# Patient Record
Sex: FEMALE | Race: WHITE | Employment: FULL TIME | URBAN - METROPOLITAN AREA
[De-identification: names, ages, dates, MRNs, and addresses within clinical notes are randomized per-mention and may not be internally consistent; named-entity substitution may affect disease eponyms.]

---

## 2017-05-01 ENCOUNTER — DOCUMENTATION ONLY (OUTPATIENT)
Dept: SURGERY | Age: 32
End: 2017-05-01

## 2017-05-02 ENCOUNTER — OFFICE VISIT (OUTPATIENT)
Dept: SURGERY | Age: 32
End: 2017-05-02

## 2017-05-02 VITALS
HEART RATE: 71 BPM | SYSTOLIC BLOOD PRESSURE: 96 MMHG | BODY MASS INDEX: 32.44 KG/M2 | DIASTOLIC BLOOD PRESSURE: 51 MMHG | WEIGHT: 190 LBS | HEIGHT: 64 IN

## 2017-05-02 DIAGNOSIS — Z15.01 BRCA2 GENE MUTATION POSITIVE: Primary | ICD-10-CM

## 2017-05-02 DIAGNOSIS — Z15.09 BRCA2 GENE MUTATION POSITIVE: Primary | ICD-10-CM

## 2017-05-02 PROBLEM — Z80.3 FAMILY HX-BREAST MALIGNANCY: Status: ACTIVE | Noted: 2017-05-02

## 2017-05-02 RX ORDER — CETIRIZINE HCL 10 MG
TABLET ORAL
Refills: 1 | COMMUNITY
Start: 2017-04-16

## 2017-05-02 NOTE — PATIENT INSTRUCTIONS
Breast Self-Exam: Care Instructions  Your Care Instructions  A breast self-exam is when you check your breasts for lumps or changes. This regular exam helps you learn how your breasts normally look and feel. Most breast problems or changes are not because of cancer. Breast self-exam is not a substitute for a mammogram. Having regular breast exams by your doctor and regular mammograms improve your chances of finding any problems with your breasts. Some women set a time each month to do a step-by-step breast self-exam. Other women like a less formal system. They might look at their breasts as they brush their teeth, or feel their breasts once in a while in the shower. If you notice a change in your breast, tell your doctor. Follow-up care is a key part of your treatment and safety. Be sure to make and go to all appointments, and call your doctor if you are having problems. Its also a good idea to know your test results and keep a list of the medicines you take. How do you do a breast self-exam?  · The best time to examine your breasts is usually one week after your menstrual period begins. Your breasts should not be tender then. If you do not have periods, you might do your exam on a day of the month that is easy to remember. · To examine your breasts:  ¨ Remove all your clothes above the waist and lie down. When you are lying down, your breast tissue spreads evenly over your chest wall, which makes it easier to feel all your breast tissue. ¨ Use the pads--not the fingertips--of the 3 middle fingers of your left hand to check your right breast. Move your fingers slowly in small coin-sized circles that overlap. ¨ Use three levels of pressure to feel of all your breast tissue. Use light pressure to feel the tissue close to the skin surface. Use medium pressure to feel a little deeper. Use firm pressure to feel your tissue close to your breastbone and ribs.  Use each pressure level to feel your breast tissue before moving on to the next spot. ¨ Check your entire breast, moving up and down as if following a strip from the collarbone to the bra line, and from the armpit to the ribs. Repeat until you have covered the entire breast.  ¨ Repeat this procedure for your left breast, using the pads of the 3 middle fingers of your right hand. · To examine your breasts while in the shower:  ¨ Place one arm over your head and lightly soap your breast on that side. ¨ Using the pads of your fingers, gently move your hand over your breast (in the strip pattern described above), feeling carefully for any lumps or changes. ¨ Repeat for the other breast.  · Have your doctor inspect anything you notice to see if you need further testing. Where can you learn more? Go to http://will-sharon.info/. Enter P148 in the search box to learn more about \"Breast Self-Exam: Care Instructions. \"  Current as of: July 26, 2016  Content Version: 11.2  © 3965-3321 [employer]. Care instructions adapted under license by [employer] (which disclaims liability or warranty for this information). If you have questions about a medical condition or this instruction, always ask your healthcare professional. [place] [address] any warranty or liability for your use of this information.

## 2017-05-02 NOTE — MR AVS SNAPSHOT
Visit Information Date & Time Provider Department Dept. Phone Encounter #  
 5/2/2017  9:00 AM Yani Mchugh, [address] Breast [address] [id number] Upcoming Health Maintenance Date Due DTaP/Tdap/Td series (1 - Tdap) 11/2/2005 PAP AKA CERVICAL CYTOLOGY 11/2/2005 INFLUENZA AGE 9 TO ADULT 8/1/2017 Allergies as of 5/2/2017  Review Complete On: 5/2/2017 By: Yani Mchugh MD  
  
 Severity Noted Reaction Type Reactions Morphine  05/02/2017    Nausea and Vomiting  
 Sulfa (Sulfonamide Antibiotics)  05/02/2017    Other (comments) Hallucinations Current Immunizations  Never Reviewed No immunizations on file. Not reviewed this visit You Were Diagnosed With   
  
 Codes Comments BRCA2 gene mutation positive    -  Primary ICD-10-CM: Z15.01, Z15.02 
ICD-9-CM: V84.01 Vitals BP Pulse Height(growth percentile) Weight(growth percentile) LMP BMI  
 96/51 (BP 1 Location: Left arm, BP Patient Position: Sitting) 71 5' 4\" (1.626 m) 190 lb (86.2 kg) 04/29/2017 32.61 kg/m2 OB Status Smoking Status Having regular periods Never Smoker BMI and BSA Data Body Mass Index Body Surface Area  
 32.61 kg/m 2 1.97 m 2 Your Updated Medication List  
  
   
This list is accurate as of: 5/2/17  4:44 PM.  Always use your most recent med list.  
  
  
  
  
 cetirizine 10 mg tablet Commonly known as:  ZYRTEC  
TAKE 1 TABLET BY MOUTH AT BEDTIME  
  
 FISH OIL PO Take  by mouth. MAGNESIUM CARBONATE PO Take  by mouth. PROBIOTIC FORMULA PO Take  by mouth. VITAMIN C PO Take  by mouth. Patient Instructions Breast Self-Exam: Care Instructions Your Care Instructions A breast self-exam is when you check your breasts for lumps or changes. This regular exam helps you learn how your breasts normally look and feel. Most breast problems or changes are not because of cancer. Breast self-exam is not a substitute for a mammogram. Having regular breast exams by your doctor and regular mammograms improve your chances of finding any problems with your breasts. Some women set a time each month to do a step-by-step breast self-exam. Other women like a less formal system. They might look at their breasts as they brush their teeth, or feel their breasts once in a while in the shower. If you notice a change in your breast, tell your doctor. Follow-up care is a key part of your treatment and safety. Be sure to make and go to all appointments, and call your doctor if you are having problems. Its also a good idea to know your test results and keep a list of the medicines you take. How do you do a breast self-exam? 
· The best time to examine your breasts is usually one week after your menstrual period begins. Your breasts should not be tender then. If you do not have periods, you might do your exam on a day of the month that is easy to remember. · To examine your breasts: ¨ Remove all your clothes above the waist and lie down. When you are lying down, your breast tissue spreads evenly over your chest wall, which makes it easier to feel all your breast tissue. ¨ Use the padsnot the fingertipsof the 3 middle fingers of your left hand to check your right breast. Move your fingers slowly in small coin-sized circles that overlap. ¨ Use three levels of pressure to feel of all your breast tissue. Use light pressure to feel the tissue close to the skin surface. Use medium pressure to feel a little deeper. Use firm pressure to feel your tissue close to your breastbone and ribs. Use each pressure level to feel your breast tissue before moving on to the next spot. ¨ Check your entire breast, moving up and down as if following a strip from the collarbone to the bra line, and from the armpit to the ribs.  Repeat until you have covered the entire breast. 
 ¨ Repeat this procedure for your left breast, using the pads of the 3 middle fingers of your right hand. · To examine your breasts while in the shower: 
¨ Place one arm over your head and lightly soap your breast on that side. ¨ Using the pads of your fingers, gently move your hand over your breast (in the strip pattern described above), feeling carefully for any lumps or changes. ¨ Repeat for the other breast. 
· Have your doctor inspect anything you notice to see if you need further testing. Where can you learn more? Go to http://will-sharon.info/. Enter P148 in the search box to learn more about \"Breast Self-Exam: Care Instructions. \" Current as of: July 26, 2016 Content Version: 11.2 © 8458-8070 [employer]. Care instructions adapted under license by [employer] (which disclaims liability or warranty for this information). If you have questions about a medical condition or this instruction, always ask your healthcare professional. [place] [address] any warranty or liability for your use of this information. Introducing \[facility]\"" & HEALTH SERVICES! Nancy Doan introduces Groupiter patient portal. Now you can access parts of your medical record, email your doctor's office, and request medication refills online. 1. In your internet browser, go to [email or web address] 2. Click on the First Time User? Click Here link in the Sign In box. You will see the New Member Sign Up page. 3. Enter your Groupiter Access Code exactly as it appears below. You will not need to use this code after youve completed the sign-up process. If you do not sign up before the expiration date, you must request a new code. · Groupiter Access Code: [email or web address] Expires: 7/31/2017  8:42 AM 
 
4. Enter the last four digits of your Social Security Number (xxxx) and Date of Birth (mm/dd/yyyy) as indicated and click Submit.  You will be taken to the next sign-up page. 5. Create a ColorChip ID. This will be your ColorChip login ID and cannot be changed, so think of one that is secure and easy to remember. 6. Create a ColorChip password. You can change your password at any time. 7. Enter your Password Reset Question and Answer. This can be used at a later time if you forget your password. 8. Enter your e-mail address. You will receive e-mail notification when new information is available in [address]. 9. Click Sign Up. You can now view and download portions of your medical record. 10. Click the Download Summary menu link to download a portable copy of your medical information. If you have questions, please visit the Frequently Asked Questions section of the ColorChip website. Remember, ColorChip is NOT to be used for urgent needs. For medical emergencies, dial 911. Now available from your iPhone and Android! Please provide this summary of care documentation to your next provider. Your primary care clinician is listed as Carl Phelan. If you have any questions after today's visit, please call [phone].

## 2017-05-02 NOTE — COMMUNICATION BODY
HISTORY OF PRESENT ILLNESS  Ovidio Tay is a 27 y.o. female. HPI    NEW patient presents for consultation at the request of Dr. Theresa Guerrero and Dr. Rosaline Cervantes for [facility] of breast cancer and BRCA 2 mutation. She found out about three weeks ago that she is BRCA positive. She has no breast complaints currently. Feels no lumps. BILATERAL screening mammogram 4/25/17 at [facility], BIRADS 1, negative. She is scheduled for a breast MRI through the 606/[facility]  FH is significant for her paternal grandmother who was diagnosed with breast CA age 28, a maternal grandmother probably had breast CA age [de-identified]. OB History     Obstetric Comments    Menarche:  14. LMP: 4/29/17. # of Children:  0. Age at Delivery of First Child:  N/A. Hysterectomy/oophorectomy:  NO/NO. Breast Bx:  No.  Hx of Breast Feeding:  N/A.  BCP:  Yes, until recently. Ling Cashing Hormone therapy:  No.           Review of Systems   Constitutional: Negative. HENT: Negative. Eyes: Negative. Respiratory: Negative. Cardiovascular: Negative. Gastrointestinal: Positive for constipation. Genitourinary: Negative. Musculoskeletal: Negative. Skin: Negative. Neurological: Negative. Endo/Heme/Allergies: Negative. Psychiatric/Behavioral: Negative. Positive for high stress. Physical Exam   Pulmonary/Chest: Right breast exhibits no inverted nipple, no mass, no nipple discharge, no skin change and no tenderness. Left breast exhibits no inverted nipple, no mass, no nipple discharge, no skin change and no tenderness. Breasts are symmetrical.   Lymphadenopathy:     She has no cervical adenopathy. She has no axillary adenopathy. Right: No supraclavicular adenopathy present. Left: No supraclavicular adenopathy present. ASSESSMENT and PLAN    ICD-10-CM ICD-9-CM    1.  BRCA2 gene mutation positive Z15.01 V84.01     Z15.02       40 minutes were spent in consultation with the patient and her  and her parents. 90% of that time was spent on discussing surveillance and surgical options. Pt's paternal grandmother had breast cancer in her 29's. There is no other history of breast cancer in her family. Pt is BRCA 2 positive. She met with Carlyn Hernandez at [facility]. He is going to test her parents to see if either one of them have a mutated BRCA gene. We discussed MRI surveillance or prophylactic mastectomies. At this point she is leaning towards prophylactic mastectomies. She is meeting with Dr Vane Danielson this afternoon. She would like to have direct-to-implant surgery and will discuss this with Dr Vane Danielson. We also talked about pre-pectoral implants. She is a santos. She is having an MRI in [facility]. She is planning on having children. She is not concerned about breast feeding so she may have mastectomies before she gets pregnant. PLAN:  Parents will be BRCA tested. Breast MRI  appt with Dr Vane Danielson.

## 2017-05-02 NOTE — PROGRESS NOTES
HISTORY OF PRESENT ILLNESS  Kiley Alas is a 27 y.o. female. HPI    NEW patient presents for consultation at the request of Dr. Veena Gramajo and Dr. Cassie Barrios for [facility] of breast cancer and BRCA 2 mutation. She found out about three weeks ago that she is BRCA positive. She has no breast complaints currently. Feels no lumps. BILATERAL screening mammogram 4/25/17 at [facility], BIRADS 1, negative. She is scheduled for a breast MRI through the 606/596 [facility]  FH is significant for her paternal grandmother who was diagnosed with breast CA age 28, a maternal grandmother probably had breast CA age [de-identified]. OB History     Obstetric Comments    Menarche:  14. LMP: 4/29/17. # of Children:  0. Age at Delivery of First Child:  N/A. Hysterectomy/oophorectomy:  NO/NO. Breast Bx:  No.  Hx of Breast Feeding:  N/A.  BCP:  Yes, until recently. Chales Minus Hormone therapy:  No.           Review of Systems   Constitutional: Negative. HENT: Negative. Eyes: Negative. Respiratory: Negative. Cardiovascular: Negative. Gastrointestinal: Positive for constipation. Genitourinary: Negative. Musculoskeletal: Negative. Skin: Negative. Neurological: Negative. Endo/Heme/Allergies: Negative. Psychiatric/Behavioral: Negative. Positive for high stress. Physical Exam   Pulmonary/Chest: Right breast exhibits no inverted nipple, no mass, no nipple discharge, no skin change and no tenderness. Left breast exhibits no inverted nipple, no mass, no nipple discharge, no skin change and no tenderness. Breasts are symmetrical.   Lymphadenopathy:     She has no cervical adenopathy. She has no axillary adenopathy. Right: No supraclavicular adenopathy present. Left: No supraclavicular adenopathy present. ASSESSMENT and PLAN    ICD-10-CM ICD-9-CM    1.  BRCA2 gene mutation positive Z15.01 V84.01     Z15.02       40 minutes were spent in consultation with the patient and her  and her parents. 90% of that time was spent on discussing surveillance and surgical options. Pt's paternal grandmother had breast cancer in her 29's. There is no other history of breast cancer in her family. Pt is BRCA 2 positive. She met with Kristel Berrios at [facility]. He is going to test her parents to see if either one of them have a mutated BRCA gene. We discussed MRI surveillance or prophylactic mastectomies. At this point she is leaning towards prophylactic mastectomies. She is meeting with Dr India Goncalves this afternoon. She would like to have direct-to-implant surgery and will discuss this with Dr India Goncalves. We also talked about pre-pectoral implants. She is a santos. She is having an MRI in [facility]. She is planning on having children. She is not concerned about breast feeding so she may have mastectomies before she gets pregnant. PLAN:  Parents will be BRCA tested. Breast MRI  appt with Dr India Goncalves.

## 2017-05-03 ENCOUNTER — TELEPHONE (OUTPATIENT)
Dept: SURGERY | Age: 32
End: 2017-05-03

## 2017-05-12 NOTE — TELEPHONE ENCOUNTER
Left a voice message about her surgery information. [facility] will call again on Monday May 15,2017 and give it to her over the phone and mail a letter if needed.

## 2017-05-15 NOTE — TELEPHONE ENCOUNTER
Verbally informed patient of surgery   Date: 07/12/2017 @ 11:30 AM    Arrive:9:30 AM  report to 2nd floor volunteer desk, take a right off the elevator    PAT: 06/29/2017 10:00 AM   Arrive: 9:30 AM report to 2nd floor volunteer desk, take a right off the elevator      Gave following instructions:  NPO after Midnight the night before  Shower in AM, no lotion, deodorant, powder,perfume or makeup  Will need  morning of the surgery    I mailed and emailed patient her surgery information. roland

## 2017-05-25 ENCOUNTER — TELEPHONE (OUTPATIENT)
Dept: SURGERY | Age: 32
End: 2017-05-25

## 2017-05-25 NOTE — TELEPHONE ENCOUNTER
The patient called to ask if she may take a probiotic up until surgery and I instructed her that she can continue with the probiotic. She does not have a date yet for her surgery. But this medication was ok to take until surgery. The patient was appreciative.

## 2017-06-06 ENCOUNTER — OFFICE VISIT (OUTPATIENT)
Dept: SURGERY | Age: 32
End: 2017-06-06

## 2017-06-06 VITALS
SYSTOLIC BLOOD PRESSURE: 113 MMHG | BODY MASS INDEX: 32.44 KG/M2 | HEART RATE: 69 BPM | WEIGHT: 190 LBS | HEIGHT: 64 IN | DIASTOLIC BLOOD PRESSURE: 70 MMHG

## 2017-06-06 DIAGNOSIS — Z15.09 BRCA2 POSITIVE: Primary | ICD-10-CM

## 2017-06-06 DIAGNOSIS — Z15.01 BRCA2 POSITIVE: Primary | ICD-10-CM

## 2017-06-06 RX ORDER — GLUCOSAMINE SULFATE 1500 MG
5000 POWDER IN PACKET (EA) ORAL DAILY
COMMUNITY

## 2017-06-06 RX ORDER — LANOLIN ALCOHOL/MO/W.PET/CERES
CREAM (GRAM) TOPICAL DAILY
COMMUNITY

## 2017-06-06 NOTE — PROGRESS NOTES
HISTORY OF PRESENT ILLNESS  Charo Claros is a 27 y.o. female. HPI ESTABLISHED patient here to discuss surgery. Patient is BRCA 2+. Prophylactic mastectomies with reconstruction scheduled for July 17 with Dr Loren Brock. Patient is doing well. FH is significant for her paternal grandmother who was diagnosed with breast CA age 28, and a maternal grandmother probably had breast CA age [de-identified]. Her father is BRCA positive. Her sister has not been tested. Breast MRI and RIGHT breast biopsy in [place] showed an intraductal papilloma. ROS    Physical Exam    ASSESSMENT and PLAN    ICD-10-CM ICD-9-CM    1. BRCA2 positive Z15.01 V84.01     Z15.02       40 minutes were spent with Ms Subhash Rebollar. She had questions about her upcoming surgery. We talked about implants, above and below the muscle, capsular contracture, pain after surgery, possibility of having to replace implants, cancer surveillance after mastectomies. She is scheduled for surgery July 17th.

## 2017-06-06 NOTE — PATIENT INSTRUCTIONS

## 2017-06-26 ENCOUNTER — DOCUMENTATION ONLY (OUTPATIENT)
Dept: SURGERY | Age: 32
End: 2017-06-26

## 2017-06-26 NOTE — PROGRESS NOTES
Patient called and left a message for me to please return her call. When I did so she let me know she was going to wait for her surgery. She was going to make sure she was making the right decision. She was going to take some time and research with her  before going through with this. I assured her that was fine and she could call us back when she decided.  I also told her to call Dr Matthew Avila if she needed to. Toni Caballero

## 2017-08-18 ENCOUNTER — TELEPHONE (OUTPATIENT)
Dept: SURGERY | Age: 32
End: 2017-08-18

## 2017-08-18 NOTE — TELEPHONE ENCOUNTER
The patient called and left a message requesting to be seen today due to \"something concerning\" on her breast. I spoke with Valerie Jacinto NP who requests I call the patient back and get more information. I called and left a message on her voicemail to call us back.

## 2017-08-18 NOTE — TELEPHONE ENCOUNTER
The patient called back stating she has her prophylactic mastectomies scheduled for next month in [place]. She found a palpable breast lump within the last several days. Suggested she come in for an office visit and she was in agreement. The appointment was made for Tuesday 8/22/2017 @ 130 pm with Dr. Mu Upton.

## 2017-08-22 ENCOUNTER — OFFICE VISIT (OUTPATIENT)
Dept: SURGERY | Age: 32
End: 2017-08-22

## 2017-08-22 VITALS
HEIGHT: 64 IN | BODY MASS INDEX: 32.44 KG/M2 | HEART RATE: 100 BPM | SYSTOLIC BLOOD PRESSURE: 120 MMHG | WEIGHT: 190 LBS | DIASTOLIC BLOOD PRESSURE: 78 MMHG

## 2017-08-22 DIAGNOSIS — N63.10 BREAST MASS, RIGHT: Primary | ICD-10-CM

## 2017-08-22 RX ORDER — PREDNISONE 10 MG/1
TABLET ORAL
Refills: 0 | COMMUNITY
Start: 2017-08-10 | End: 2017-10-05 | Stop reason: ALTCHOICE

## 2017-08-22 NOTE — COMMUNICATION BODY
HISTORY OF PRESENT ILLNESS  Barbra Castaneda is a 27 y.o. female. HPI ESTABLISHED patient presents with a RIGHT breast lump at the inframammary fold. The lump is the size of a lima bean and is slightly tender to palpation. It is close to the surface of her skin. She is scheduled for prophylactic mastectomy in [facility]. next month. She denies any nipple inversion or discharge. BRCA 2 positive. FH is significant for her paternal grandmother who was diagnosed with breast CA age 28, a maternal grandmother probably had breast CA age [de-identified]. The patient's father is BRCA positive. ROS    Physical Exam   Pulmonary/Chest: Right breast exhibits mass (1 cm oval soft mobile mass at inframammary fold. ). Right breast exhibits no inverted nipple, no nipple discharge, no skin change and no tenderness. Left breast exhibits no inverted nipple, no mass, no nipple discharge, no skin change and no tenderness. Breasts are symmetrical.       Lymphadenopathy:     She has no cervical adenopathy. She has no axillary adenopathy. Right: No supraclavicular adenopathy present. Left: No supraclavicular adenopathy present. BREAST ULTRASOUND  Indication: Right  breast mass, 1 cm soft mobile mass  Technique: The area was scanned using a high-frequency linear-array near-field transducer  Findings: 9.7 mm fatty lobule corresponds with palpable mass  Impression: Normal breast tissue   Disposition: No worrisome finding on ultrasound  ASSESSMENT and PLAN    ICD-10-CM ICD-9-CM    1. Breast mass, right N63 611.72      RIGHT breast mass is a normal fatty lobule. Pt is having prophylactic mastectomies with prepectoral implants in one month ([facility])  MRI every 3 years were recommended for surveillance. PRN follow up.

## 2017-08-22 NOTE — PROGRESS NOTES
HISTORY OF PRESENT ILLNESS  Ancelmo Lozano is a 27 y.o. female. HPI ESTABLISHED patient presents with a RIGHT breast lump at the inframammary fold. The lump is the size of a lima bean and is slightly tender to palpation. It is close to the surface of her skin. She is scheduled for prophylactic mastectomy in [facility]. next month. She denies any nipple inversion or discharge. BRCA 2 positive. FH is significant for her paternal grandmother who was diagnosed with breast CA age 28, a maternal grandmother probably had breast CA age [de-identified]. The patient's father is BRCA positive. ROS    Physical Exam   Pulmonary/Chest: Right breast exhibits mass (1 cm oval soft mobile mass at inframammary fold. ). Right breast exhibits no inverted nipple, no nipple discharge, no skin change and no tenderness. Left breast exhibits no inverted nipple, no mass, no nipple discharge, no skin change and no tenderness. Breasts are symmetrical.       Lymphadenopathy:     She has no cervical adenopathy. She has no axillary adenopathy. Right: No supraclavicular adenopathy present. Left: No supraclavicular adenopathy present. BREAST ULTRASOUND  Indication: Right  breast mass, 1 cm soft mobile mass  Technique: The area was scanned using a high-frequency linear-array near-field transducer  Findings: 9.7 mm fatty lobule corresponds with palpable mass  Impression: Normal breast tissue   Disposition: No worrisome finding on ultrasound  ASSESSMENT and PLAN    ICD-10-CM ICD-9-CM    1. Breast mass, right N63 611.72      RIGHT breast mass is a normal fatty lobule. Pt is having prophylactic mastectomies with prepectoral implants in one month ([facility] [address] [facility])  MRI every 3 years were recommended for surveillance. PRN follow up.

## 2017-08-22 NOTE — MR AVS SNAPSHOT
Visit Information Date & Time Provider Department Dept. Phone Encounter #  
 8/22/2017  1:30 PM Carroll Garcia, [address] [facility] [phone] [id number] Upcoming Health Maintenance Date Due DTaP/Tdap/Td series (1 - Tdap) 11/2/2005 PAP AKA CERVICAL CYTOLOGY 11/2/2005 INFLUENZA AGE 9 TO ADULT 8/1/2017 Allergies as of 8/22/2017  Review Complete On: 8/22/2017 By: Carroll Garcia MD  
  
 Severity Noted Reaction Type Reactions Morphine  05/02/2017    Nausea and Vomiting  
 Sulfa (Sulfonamide Antibiotics)  05/02/2017    Other (comments) Hallucinations Current Immunizations  Never Reviewed No immunizations on file. Not reviewed this visit You Were Diagnosed With   
  
 Codes Comments Breast mass, right    -  Primary ICD-10-CM: N63 
ICD-9-CM: 611.72 Vitals BP Pulse Height(growth percentile) Weight(growth percentile) BMI OB Status 120/78 100 5' 4\" (1.626 m) 190 lb (86.2 kg) 32.61 kg/m2 Having regular periods Smoking Status Never Smoker BMI and BSA Data Body Mass Index Body Surface Area  
 32.61 kg/m 2 1.97 m 2 Your Updated Medication List  
  
   
This list is accurate as of: 8/22/17  4:14 PM.  Always use your most recent med list.  
  
  
  
  
 cetirizine 10 mg tablet Commonly known as:  ZYRTEC  
TAKE 1 TABLET BY MOUTH AT BEDTIME  
  
 FISH OIL PO Take  by mouth. MAGNESIUM CARBONATE PO Take 300 mg by mouth.  
  
 predniSONE 10 mg tablet Commonly known as:  DELTASONE  
TAKE 6 TABS ON DAYS 1&2,5 TABS DAYS 3&4,4 TABS DAYS 5&6,3 TABS ON 7&8,2 TABS 9&10&1 TAB DAYS 11&12 PROBIOTIC 4X 10-15 mg Tbec Generic drug:  B.infantis-B.ani-B.long-B.bifi Take  by mouth. PROBIOTIC FORMULA PO Take  by mouth. VITAMIN B-6 50 mg tablet Generic drug:  pyridoxine (vitamin B6) Take  by mouth daily. VITAMIN C PO Take 4,000 mg by mouth. VITAMIN D3 1,000 unit Cap Generic drug:  cholecalciferol Take 5,000 Units by mouth daily. Introducing [facility]! Tami Cline introduces [employer] patient portal. Now you can access parts of your medical record, email your doctor's office, and request medication refills online. 1. In your internet browser, go to [email or web address] 2. Click on the First Time User? Click Here link in the Sign In box. You will see the New Member Sign Up page. 3. Enter your [employer] Access Code exactly as it appears below. You will not need to use this code after youve completed the sign-up process. If you do not sign up before the expiration date, you must request a new code. · [employer] Access Code: [email or web address] Expires: 11/20/2017  2:03 PM 
 
4. Enter the last four digits of your Social Security Number (xxxx) and Date of Birth (mm/dd/yyyy) as indicated and click Submit. You will be taken to the next sign-up page. 5. Create a [employer] ID. This will be your [employer] login ID and cannot be changed, so think of one that is secure and easy to remember. 6. Create a [employer] password. You can change your password at any time. 7. Enter your Password Reset Question and Answer. This can be used at a later time if you forget your password. 8. Enter your e-mail address. You will receive e-mail notification when new information is available in [address]. 9. Click Sign Up. You can now view and download portions of your medical record. 10. Click the Download Summary menu link to download a portable copy of your medical information. If you have questions, please visit the Frequently Asked Questions section of the [employer] website. Remember, [employer] is NOT to be used for urgent needs. For medical emergencies, dial 911. Now available from your iPhone and Android! Please provide this summary of care documentation to your next provider. Your primary care clinician is listed as Noemi Clement. If you have any questions after today's visit, please call [phone].

## 2017-10-04 ENCOUNTER — HOSPITAL ENCOUNTER (OUTPATIENT)
Dept: PHYSICAL THERAPY | Age: 32
Discharge: HOME | End: 2017-10-04
Payer: MEDICAID

## 2017-10-04 PROCEDURE — 97530 THERAPEUTIC ACTIVITIES: CPT | Performed by: PHYSICAL THERAPIST

## 2017-10-04 PROCEDURE — 97161 PT EVAL LOW COMPLEX 20 MIN: CPT | Performed by: PHYSICAL THERAPIST

## 2017-10-04 PROCEDURE — 97110 THERAPEUTIC EXERCISES: CPT | Performed by: PHYSICAL THERAPIST

## 2017-10-04 NOTE — PROGRESS NOTES
[address] [place], [facility] Blanca Thorne 56  Phone: [phone]  Fax: [phone]    Plan of Care/ Statement of Necessity for Physical Therapy Services 2-15    Patient name: Song Smith  : 1985  Provider#: [id number]  Referral source: Dale Mesa MD      Medical/Treatment Diagnosis: Breast pain, right [N64.4]  Pain in right shoulder [M25.511]  Pain in left shoulder [M25.512]     Prior Hospitalization: see medical history     Comorbidities: previous parathyroid surgery, diaphragmatic/colon childhood surgery, BRCA2+  Prior Level of Function: no limitations in UE ROM or strength during any ADL's  Medications: Verified on Patient Summary List    Start of Care: 10/4/2017      Onset Date: 2017       The Plan of Care and following information is based on the information from the initial evaluation. Assessment/ key information: Pt s/p B mastectomies with prepectoral implant reconstruction on 2017. Pt discovered a \"hardened area around right pec muscle\" and was referred to PT for \"cording\". Pt presents with tenderness to palpation along R lateral border of pectoralis major, decreased AROM B sh flexion, external rotation, and abduction with end range discomfort.     Evaluation Complexity History LOW Complexity : Zero comorbidities / personal factors that will impact the outcome / POC; Examination LOW Complexity : 1-2 Standardized tests and measures addressing body structure, function, activity limitation and / or participation in recreation  ;Presentation LOW Complexity : Stable, uncomplicated  ;Clinical Decision Making MEDIUM Complexity : FOTO score of 26-74  Overall Complexity Rating: LOW     Problem List: pain affecting function, decrease ROM, decrease strength, edema affecting function and decrease ADL/ functional abilitiies   Treatment Plan may include any combination of the following: Therapeutic exercise, Therapeutic activities, Neuromuscular re-education, Manual therapy, Patient education and Functional mobility training  Patient / Family readiness to learn indicated by: asking questions, trying to perform skills and interest  Persons(s) to be included in education: patient (P)  Barriers to Learning/Limitations: None  Patient Goal (s): get rid of cording  Patient Self Reported Health Status: good  Rehabilitation Potential: good    Short Term Goals: To be accomplished in 2  treatments:  1) Pt will be Independent with skin care routine to decrease risk of infection. 2) Pt will be Independent in don/doff/wearing schedule of light compression to decrease discomfort. 3) Pt will know which signs and symptoms to report immediately to physician concerning their condition. Long Term Goals: To be accomplished in 12 treatments:  1) Pt will be Independent with compression management routine consisting of skin care, decongestive exercises, self manual lymphatic stimulation techniques, and don/doff of appropriate compression garment if necessary,  2) Pt will be Independent with HEP for edema management, utilizing correct breath pattern, strengthening, and motion exercises. 3) Pt will utilize correct breath and movement patterns during all ADL's involving reaching above shoulder level without pain. 4) Pt will have increased B shoulder AROM elevation and ER by 30-40% with decreased pain by 3-4 levels when using B UE's in all ADL's involving reaching at or above shoulder level. Frequency / Duration: Patient to be seen 1-2 times per week for 12 treatments. Patient/ Caregiver education and instruction: activity modification, exercises and other infection risk reduction    [x]  Plan of care has been reviewed with KAREN AHUMADA, CLT-MITCHELL 10/4/2017 3:16 PM    ________________________________________________________________________    I certify that the above Therapy Services are being furnished while the patient is under my care.  I agree with the treatment plan and certify that this therapy is necessary.     [de-identified] Signature:____________________  Date:____________Time: _________

## 2017-10-04 NOTE — PROGRESS NOTES
PT ONCOLOGY EVAL/DAILY NOTE    Patient Name: Patricia Singhun  Date:10/4/2017  : 1985  [x]  Patient  Verified  Payor: [employer] MEDICAID / Plan: [place] [employer] [place] / Product Type: Managed Care Medicaid /    In time:2:00  Out time:3:00  Total Treatment Time (min): 60  Total Timed Codes (min): 30     Visit #: 1 of 12    Treatment Area: Breast pain, right [N64.4]  Pain in right shoulder [M25.511]  Pain in left shoulder [M25.512]    SUBJECTIVE    Any medication changes, allergies to medications, adverse drug reactions, diagnosis change, or new procedure performed?: [x] No    [] Yes (see summary sheet for update)    Pain Level : At Present: 1/10, At [facility] 1/10, At [facility] 1/10   [] Constant [x]Intermittent      Current symptoms/Complaints: Pt c/o's of \"hardened area around R pectoralis major muscle\" that is tender to touch and increases in discomfort when reaching above shoulder level     [] Constant [x]Intermittent    Subjective functional status:     Present Symptoms/History: B mastectomies with prepectoral implant reconstruction on 2017  Referring Provider: Cassy Bassett MD    Plastic Surgeon: Dr Shantel Arias   Primary Care Physician: Ronney Schlatter, MD  Next Appointment: Dr China Fournier [facility]) Oct 20th  Diagnosis:  S/p B prophylactic mastectomies with implant reconstruction,     Precautions/Indications: infection risk  History of Present Illness:      Surgery:B mastectomies with prepectoral implant reconstruction on 2017          ·  Pain Intensity:1 on a scale of 0-10  · Pain Aggravating Factors: stretch to R pec area  · Pain Relieving Factors: not moving R UE      Has your activity changed since diagnosis? yes  Limitations/Prior level of functioning: lifting restrictions no more than 10 pounds following surgery on 2017.   Pt has been working from home 3-4 hrs/day and plans on returning 1/2 days to work on Tuesday 10/10/2017  Dominant Hand: right handed  Personal Goals: get rid of cording/hardness in area of R chest  Home Situation (including environment, stairs, family support, if living alone, any DME used at home):  Good family support of sisters and mother. Mother present at evaluation  Work Status: working from home, , returning 1/2 days on 10/10/2017   Current meds: see chart    Barriers:    [x]N/A []pain []financial []time []transportation []other  Motivation: high  Substance use:   [x]N/A  []Alcohol []Tobacco []other:   FABQ Score: [x]low []elevate    Cognition: A & O x 4        OBJECTIVE    Skin/Soft Tissue: no signs of infection at incision or drain sites. Several open areas at incision that pt was instructed by surgeon to keep clean and dry. Currently applying non adherent gauze at all sites underneath light compression bra     Scapulohumoral Control / Rhythm:   Able to eccentrically lower with good control?  Left:   No         Right: No      Upper Extremity AROM:                                                                                R                                 L  Shoulder Flexion  125   117                                                                                   Shoulder Abduction 119   115  Shoulder Extension 15   21  Shoulder ER  45   43  Shoulder IR  52   49                                                                                                         Elbow and wrist = WNL's Bilaterally                                            UPPER QUARTER                           MUSCLE STRENGTH  KEY                                                                            R                     L  0 - No Contraction                  C1, C2 Neck Flex       4                                 1 - Trace                                 C3 Side Flex   4  4                                       2 - Poor                                   C4 Sh Elev 4  4                                            3 - Fair                                     C5 Deltoid/Biceps 3+  3+                                 4 - Good                                  C6 Wrist Ext 4+  4+                                          5 - Normal                               C7 Triceps 3+  3+                                                                                            C8 Thumb Ext 4+  4                                                                                                  T1 Hand Inst 4+  4                                           MMT: See above  Shoulder flexion     3-  3+  Shoulder ER     3  3  Shoulder IR     3  3    Neurological: Sensations: intact to LT throughout B upper quarter    Mobility/Gait: No observable gait deficits, slight difficulty don/doff bra and shirts   Palpation: TTP along R lateral border of pec major  Posture: forward head, B g-h jt IR  Breath pattern assessment  Diaphragm: difficulty with initiation, requires v.c's, not primary pattern  Anterior chest: primary   Accessory respiratory muscle use: UT, scalenes    30 min [x]Eval                  []Re-Eval       15 min Therapeutic Exercise:  [x] See flow sheet :   Rationale: increase ROM and increase strength to improve the patients ability to perform ADL's required for return to work and/or community      15 min Therapeutic Activity:  [x]  See flow sheet :   Rationale: infection risk reduction and skin care education  to improve the patients ability to decrease post op complications            With   [] TE   [] TA   [] neuro   [] other: Patient Education: [x] Review HEP    [] Progressed/Changed HEP based on:   [] positioning   [] body mechanics   [] transfers   [] heat/ice application    [] other:           Pain Level (0-10 scale) post treatment: 1    ASSESSMENT/Changes in Function: Pt s/p B mastectomies with prepectoral implant reconstruction on 9/22/2017. Pt discovered a \"hardened area around right pec muscle\" and was referred to PT for \"cording\".   Pt presents with tenderness to palpation along R lateral border of pectoralis major, decreased AROM B sh flexion, external rotation, and abduction with end range discomfort.  Patient will benefit from skilled PT services to modify and progress therapeutic interventions, address functional mobility deficits, address ROM deficits, address strength deficits, analyze and address soft tissue restrictions, analyze and cue movement patterns, assess and modify postural abnormalities and instruct in home and community integration to address rehab goals per plan of care          Goals:  See POC  PLAN  []  Upgrade activities as tolerated     [x]  Continue plan of care  []  Update interventions per flow sheet       []  Discharge due to:_  []  Other:_      TAMARA Easton 10/4/2017  3:16 PM

## 2017-10-05 ENCOUNTER — OFFICE VISIT (OUTPATIENT)
Dept: SURGERY | Age: 32
End: 2017-10-05

## 2017-10-05 VITALS
SYSTOLIC BLOOD PRESSURE: 121 MMHG | WEIGHT: 190 LBS | HEIGHT: 64 IN | BODY MASS INDEX: 32.44 KG/M2 | DIASTOLIC BLOOD PRESSURE: 75 MMHG | HEART RATE: 83 BPM

## 2017-10-05 DIAGNOSIS — N63.0 BREAST MASS: Primary | ICD-10-CM

## 2017-10-05 RX ORDER — DIAZEPAM 5 MG/1
5 TABLET ORAL
COMMUNITY
End: 2018-03-20

## 2017-10-05 RX ORDER — IBUPROFEN 200 MG
TABLET ORAL
COMMUNITY

## 2017-10-05 NOTE — MR AVS SNAPSHOT
Visit Information Date & Time Provider Department Dept. Phone Encounter #  
 10/5/2017  3:30 PM Padmini Pena MD [facility] [phone] [id number] Upcoming Health Maintenance Date Due DTaP/Tdap/Td series (1 - Tdap) 11/2/2005 PAP AKA CERVICAL CYTOLOGY 11/2/2005 INFLUENZA AGE 9 TO ADULT 8/1/2017 Allergies as of 10/5/2017  Review Complete On: 10/5/2017 By: Ginger Bedoya RN Severity Noted Reaction Type Reactions Morphine  05/02/2017    Nausea and Vomiting  
 Sulfa (Sulfonamide Antibiotics)  05/02/2017    Other (comments) Hallucinations Current Immunizations  Never Reviewed No immunizations on file. Not reviewed this visit Vitals BP Pulse Height(growth percentile) Weight(growth percentile) BMI OB Status 121/75 83 5' 4\" (1.626 m) 190 lb (86.2 kg) 32.61 kg/m2 Having regular periods Smoking Status Never Smoker BMI and BSA Data Body Mass Index Body Surface Area  
 32.61 kg/m 2 1.97 m 2 Your Updated Medication List  
  
   
This list is accurate as of: 10/5/17  4:08 PM.  Always use your most recent med list.  
  
  
  
  
 cetirizine 10 mg tablet Commonly known as:  ZYRTEC  
TAKE 1 TABLET BY MOUTH AT BEDTIME  
  
 FISH OIL PO Take  by mouth. ibuprofen 200 mg tablet Commonly known as:  MOTRIN Take  by mouth every six (6) hours as needed for Pain. MAGNESIUM CARBONATE PO Take 300 mg by mouth. PROBIOTIC 4X 10-15 mg Tbec Generic drug:  B.infantis-B.ani-B.long-B.bifi Take  by mouth. PROBIOTIC FORMULA PO Take  by mouth. VALIUM 5 mg tablet Generic drug:  diazePAM  
Take 5 mg by mouth every six (6) hours as needed for Anxiety or Sleep. VITAMIN B-6 50 mg tablet Generic drug:  pyridoxine (vitamin B6) Take  by mouth daily. VITAMIN C PO Take 4,000 mg by mouth. VITAMIN D3 1,000 unit Cap Generic drug:  cholecalciferol Take 5,000 Units by mouth daily. Patient Instructions Mastectomy: What to Expect at [facility] Your Recovery Right after the surgery you will probably feel weak, and you may feel sore for 2 to 3 days. You may have a pulling or stretching sensation near or under your arm. You may also have itching, tingling, and throbbing in the area. This will get better in a few days. You will probably have a plastic or rubber tube, called a drain, to collect fluid from under the affected arm on the side of the surgery. Your doctor will remove this when the fluid buildup slows. This can be in a few days or even several weeks. You may be able to go back to your normal routine or return to work in several weeks, but it may take longer. How long it takes you to recover will depend on the type of surgery you had. It also depends on whether you had breast reconstruction at the same time, or if you need other treatment. Your doctor or nurse will be able to give you an idea of what you can expect. When you find out that you have cancer, you may feel many emotions and may need some help coping. This is common. Seek out family, friends, and counselors for support. You also can do things at home to make yourself feel better while you go through treatment. Call the [facility] (1-285.130.4268) or visit its website at [employer] for more information. This care sheet gives you a general idea about how long it will take for you to recover. But each person recovers at a different pace. Follow the steps below to get better as quickly as possible. How can you care for yourself at home? Activity · Rest when you feel tired. Getting enough sleep will help you recover. After any activity, rest and raise your affected arm for a period of time equal to your activity time. · Try to walk each day.  Start by walking a little more than you did the day before. Bit by bit, increase the amount you walk. Walking boosts blood flow and helps prevent pneumonia and constipation. · Avoid strenuous activities, such as biking, jogging, weightlifting, or aerobic exercise, until your doctor says it is okay. This includes housework, especially if you have to use your affected arm. You will probably be able to do your normal activities in 3 to 6 weeks. Avoid repeated motions with your affected arm, such as weed pulling, window cleaning, or vacuuming, for 6 months. · Avoid lifting anything over 10 to 15 pounds for 4 to 6 weeks. This may include a child, grocery bags, a heavy briefcase or backpack, cat litter or dog food bags, or a vacuum . · Ask your doctor when you can drive again. · You will probably be able to go back to work or your normal routine in 3 to 6 weeks. This depends on the type of work you do and any further treatment. · Your doctor will let you know how soon you can take showers or baths. Diet · You can eat your normal diet. If your stomach is upset, try bland, low-fat foods like plain rice, broiled chicken, toast, and yogurt. · Drink plenty of fluids (unless your doctor tells you not to). · You may notice that your bowels are not regular right after your surgery. This is common. Try to avoid constipation and straining with bowel movements. Take a fiber supplement such as Citrucel or Metamucil every day. If you have not had a bowel movement after a couple of days, take a mild laxative like Milk of Magnesia or a stool softener like Colace. Medicines · Your doctor will tell you if and when you can restart your medicines. He or she will also give you instructions about taking any new medicines. · If you take blood thinners, such as warfarin (Coumadin), clopidogrel (Plavix), or aspirin, be sure to talk to your doctor. He or she will tell you if and when to start taking those medicines again.  Make sure that you understand exactly what your doctor wants you to do. · Take pain medicines exactly as directed. ¨ If the doctor gave you a prescription medicine for pain, take it as prescribed. ¨ If you are not taking a prescription pain medicine, ask your doctor if you can take an over-the-counter medicine. · If your doctor prescribed antibiotics, take them as directed. Do not stop taking them just because you feel better. You need to take the full course of antibiotics. · If you think your pain medicine is making you sick to your stomach: 
¨ Take your medicine after meals (unless your doctor has told you not to). ¨ Ask your doctor for a different pain medicine. Incision care · You will have a dressing over the cut (incision). A dressing helps the incision heal and protects it. Your doctor will tell you how to take care of this. · You may be wearing a special bra (surgi-bra) that holds your dressing in place after the surgery. Your doctor will tell you when you can stop wearing the bra. Arm exercises · If you had any lymph nodes removed from under your arm, your doctor will advise you to do arm exercises. Do not do the exercises until your doctor says it is okay. Ice and elevation · Do not use ice for swelling or pain. · Prop up your arm on a pillow when you sit or lie down. Try to keep your arm above the level of your heart. This will help reduce swelling. Other instructions · You may have one or more drains in your surgery site. Your doctor will tell you how to take care of them. Follow-up care is a key part of your treatment and safety. Be sure to make and go to all appointments, and call your doctor if you are having problems. It's also a good idea to know your test results and keep a list of the medicines you take. When should you call for help? Call 911 anytime you think you may need emergency care. For example, call if: 
· You passed out (lost consciousness). · You have severe trouble breathing. · You have sudden chest pain and shortness of breath, or you cough up blood. Call your doctor now or seek immediate medical care if: · Fluid collects under the skin of the surgery site. · Fluid is leaking around the drain, you have a sudden increase in fluid, or you have no new fluid in the drain for 24 hours. · You have sudden swelling of your arm, hands, or fingers. · You have pain that does not go away when you take your pain medicine. · You have loose stitches, your incision comes open, or the skin around the incision turns dark, purple or black. · Bright red blood has soaked through your bandage. · You have signs of infection, such as: 
¨ Increased pain, swelling, warmth, or redness. ¨ Red streaks leading from the incision. ¨ Pus draining from the incision. ¨ A fever. Watch closely for changes in your health, and be sure to contact your doctor if: 
· You feel any changes in your other breast during breast self-exams. This includes lumps, skin changes, or nipple drainage. · You have signs of lymphedema, which can happen if lymph nodes were removed. Signs of lymphedema include: ¨ A feeling of tightness or swelling in or around your arm. ¨ Pain, weakness that keeps getting worse, or a tingling \"pins and needles\" feeling. ¨ Feeling full or heavy in your arm. ¨ Your hand or wrist feeling stiff and hard to move. ¨ Swelling in your fingers. · You feel anxious or depressed, or you have trouble sleeping. · You do not have a bowel movement after taking a laxative. Where can you learn more? Go to http://will-sharon.info/. Enter Y146 in the search box to learn more about \"Mastectomy: What to Expect at Home. \" Current as of: September 22, 2016 Content Version: 11.3 © 4197-8302 [employer]. Care instructions adapted under license by [employer] (which disclaims liability or warranty for this information).  If you have questions about a medical condition or this instruction, always ask your healthcare professional. [address] any warranty or liability for your use of this information. Mastectomy: What to Expect at [facility] Your Recovery Right after the surgery you will probably feel weak, and you may feel sore for 2 to 3 days. You may have a pulling or stretching sensation near or under your arm. You may also have itching, tingling, and throbbing in the area. This will get better in a few days. You will probably have a plastic or rubber tube, called a drain, to collect fluid from under the affected arm on the side of the surgery. Your doctor will remove this when the fluid buildup slows. This can be in a few days or even several weeks. You may be able to go back to your normal routine or return to work in several weeks, but it may take longer. How long it takes you to recover will depend on the type of surgery you had. It also depends on whether you had breast reconstruction at the same time, or if you need other treatment. Your doctor or nurse will be able to give you an idea of what you can expect. When you find out that you have cancer, you may feel many emotions and may need some help coping. This is common. Seek out family, friends, and counselors for support. You also can do things at home to make yourself feel better while you go through treatment. Call the [facility] (3-873.254.6720) or visit its website at [employer] for more information. This care sheet gives you a general idea about how long it will take for you to recover. But each person recovers at a different pace. Follow the steps below to get better as quickly as possible. How can you care for yourself at home? Activity · Rest when you feel tired. Getting enough sleep will help you recover. After any activity, rest and raise your affected arm for a period of time equal to your activity time. · Try to walk each day. Start by walking a little more than you did the day before. Bit by bit, increase the amount you walk. Walking boosts blood flow and helps prevent pneumonia and constipation. · Avoid strenuous activities, such as biking, jogging, weightlifting, or aerobic exercise, until your doctor says it is okay. This includes housework, especially if you have to use your affected arm. You will probably be able to do your normal activities in 3 to 6 weeks. Avoid repeated motions with your affected arm, such as weed pulling, window cleaning, or vacuuming, for 6 months. · Avoid lifting anything over 10 to 15 pounds for 4 to 6 weeks. This may include a child, grocery bags, a heavy briefcase or backpack, cat litter or dog food bags, or a vacuum . · Ask your doctor when you can drive again. · You will probably be able to go back to work or your normal routine in 3 to 6 weeks. This depends on the type of work you do and any further treatment. · Your doctor will let you know how soon you can take showers or baths. Diet · You can eat your normal diet. If your stomach is upset, try bland, low-fat foods like plain rice, broiled chicken, toast, and yogurt. · Drink plenty of fluids (unless your doctor tells you not to). · You may notice that your bowels are not regular right after your surgery. This is common. Try to avoid constipation and straining with bowel movements. Take a fiber supplement such as Citrucel or Metamucil every day. If you have not had a bowel movement after a couple of days, take a mild laxative like Milk of Magnesia or a stool softener like Colace. Medicines · Your doctor will tell you if and when you can restart your medicines. He or she will also give you instructions about taking any new medicines. · If you take blood thinners, such as warfarin (Coumadin), clopidogrel (Plavix), or aspirin, be sure to talk to your doctor.  He or she will tell you if and when to start taking those medicines again. Make sure that you understand exactly what your doctor wants you to do. · Take pain medicines exactly as directed. ¨ If the doctor gave you a prescription medicine for pain, take it as prescribed. ¨ If you are not taking a prescription pain medicine, ask your doctor if you can take an over-the-counter medicine. · If your doctor prescribed antibiotics, take them as directed. Do not stop taking them just because you feel better. You need to take the full course of antibiotics. · If you think your pain medicine is making you sick to your stomach: 
¨ Take your medicine after meals (unless your doctor has told you not to). ¨ Ask your doctor for a different pain medicine. Incision care · You will have a dressing over the cut (incision). A dressing helps the incision heal and protects it. Your doctor will tell you how to take care of this. · You may be wearing a special bra (surgi-bra) that holds your dressing in place after the surgery. Your doctor will tell you when you can stop wearing the bra. Arm exercises · If you had any lymph nodes removed from under your arm, your doctor will advise you to do arm exercises. Do not do the exercises until your doctor says it is okay. Ice and elevation · Do not use ice for swelling or pain. · Prop up your arm on a pillow when you sit or lie down. Try to keep your arm above the level of your heart. This will help reduce swelling. Other instructions · You may have one or more drains in your surgery site. Your doctor will tell you how to take care of them. Follow-up care is a key part of your treatment and safety. Be sure to make and go to all appointments, and call your doctor if you are having problems. It's also a good idea to know your test results and keep a list of the medicines you take. When should you call for help? Call 911 anytime you think you may need emergency care. For example, call if: · You passed out (lost consciousness). · You have severe trouble breathing. · You have sudden chest pain and shortness of breath, or you cough up blood. Call your doctor now or seek immediate medical care if: · Fluid collects under the skin of the surgery site. · Fluid is leaking around the drain, you have a sudden increase in fluid, or you have no new fluid in the drain for 24 hours. · You have sudden swelling of your arm, hands, or fingers. · You have pain that does not go away when you take your pain medicine. · You have loose stitches, your incision comes open, or the skin around the incision turns dark, purple or black. · Bright red blood has soaked through your bandage. · You have signs of infection, such as: 
¨ Increased pain, swelling, warmth, or redness. ¨ Red streaks leading from the incision. ¨ Pus draining from the incision. ¨ A fever. Watch closely for changes in your health, and be sure to contact your doctor if: 
· You feel any changes in your other breast during breast self-exams. This includes lumps, skin changes, or nipple drainage. · You have signs of lymphedema, which can happen if lymph nodes were removed. Signs of lymphedema include: ¨ A feeling of tightness or swelling in or around your arm. ¨ Pain, weakness that keeps getting worse, or a tingling \"pins and needles\" feeling. ¨ Feeling full or heavy in your arm. ¨ Your hand or wrist feeling stiff and hard to move. ¨ Swelling in your fingers. · You feel anxious or depressed, or you have trouble sleeping. · You do not have a bowel movement after taking a laxative. Where can you learn more? Go to http://will-sharon.info/. Enter C477 in the search box to learn more about \"Mastectomy: What to Expect at Home. \" Current as of: September 22, 2016 Content Version: 11.3 © 8555-6773 [employer].  Care instructions adapted under license by [address] (which disclaims liability or warranty for this information). If you have questions about a medical condition or this instruction, always ask your healthcare professional. [address] any warranty or liability for your use of this information. Introducing [facility]! Tee Helio introduces [employer] patient portal. Now you can access parts of your medical record, email your doctor's office, and request medication refills online. 1. In your internet browser, go to [email or web address]://[email or web address] 2. Click on the First Time User? Click Here link in the Sign In box. You will see the New Member Sign Up page. 3. Enter your [employer] Access Code exactly as it appears below. You will not need to use this code after youve completed the sign-up process. If you do not sign up before the expiration date, you must request a new code. · [employer] Access Code: [email or web address] Expires: 11/20/2017  2:03 PM 
 
4. Enter the last four digits of your Social Security Number (xxxx) and Date of Birth (mm/dd/yyyy) as indicated and click Submit. You will be taken to the next sign-up page. 5. Create a [employer] ID. This will be your [employer] login ID and cannot be changed, so think of one that is secure and easy to remember. 6. Create a [employer] password. You can change your password at any time. 7. Enter your Password Reset Question and Answer. This can be used at a later time if you forget your password. 8. Enter your e-mail address. You will receive e-mail notification when new information is available in [address]. 9. Click Sign Up. You can now view and download portions of your medical record. 10. Click the Download Summary menu link to download a portable copy of your medical information. If you have questions, please visit the Frequently Asked Questions section of the [employer] website.  Remember, [employer] is NOT to be used for urgent needs. For medical emergencies, dial 911. Now available from your iPhone and Android! Please provide this summary of care documentation to your next provider. Your primary care clinician is listed as Demarcus Duckworth. If you have any questions after today's visit, please call [phone].

## 2017-10-05 NOTE — PATIENT INSTRUCTIONS
Mastectomy: What to Expect at [address]  Right after the surgery you will probably feel weak, and you may feel sore for 2 to 3 days. You may have a pulling or stretching sensation near or under your arm. You may also have itching, tingling, and throbbing in the area. This will get better in a few days. You will probably have a plastic or rubber tube, called a drain, to collect fluid from under the affected arm on the side of the surgery. Your doctor will remove this when the fluid buildup slows. This can be in a few days or even several weeks. You may be able to go back to your normal routine or return to work in several weeks, but it may take longer. How long it takes you to recover will depend on the type of surgery you had. It also depends on whether you had breast reconstruction at the same time, or if you need other treatment. Your doctor or nurse will be able to give you an idea of what you can expect. When you find out that you have cancer, you may feel many emotions and may need some help coping. This is common. Seek out family, friends, and counselors for support. You also can do things at home to make yourself feel better while you go through treatment. Call the [employer] (3-346.501.5516) or visit its website at [address] [employer] for more information. This care sheet gives you a general idea about how long it will take for you to recover. But each person recovers at a different pace. Follow the steps below to get better as quickly as possible. How can you care for yourself at home? Activity  · Rest when you feel tired. Getting enough sleep will help you recover. After any activity, rest and raise your affected arm for a period of time equal to your activity time. · Try to walk each day. Start by walking a little more than you did the day before. Bit by bit, increase the amount you walk. Walking boosts blood flow and helps prevent pneumonia and constipation.   · Avoid strenuous activities, such as biking, jogging, weightlifting, or aerobic exercise, until your doctor says it is okay. This includes housework, especially if you have to use your affected arm. You will probably be able to do your normal activities in 3 to 6 weeks. Avoid repeated motions with your affected arm, such as weed pulling, window cleaning, or vacuuming, for 6 months. · Avoid lifting anything over 10 to 15 pounds for 4 to 6 weeks. This may include a child, grocery bags, a heavy briefcase or backpack, cat litter or dog food bags, or a vacuum . · Ask your doctor when you can drive again. · You will probably be able to go back to work or your normal routine in 3 to 6 weeks. This depends on the type of work you do and any further treatment. · Your doctor will let you know how soon you can take showers or baths. Diet  · You can eat your normal diet. If your stomach is upset, try bland, low-fat foods like plain rice, broiled chicken, toast, and yogurt. · Drink plenty of fluids (unless your doctor tells you not to). · You may notice that your bowels are not regular right after your surgery. This is common. Try to avoid constipation and straining with bowel movements. Take a fiber supplement such as Citrucel or Metamucil every day. If you have not had a bowel movement after a couple of days, take a mild laxative like Milk of Magnesia or a stool softener like Colace. Medicines  · Your doctor will tell you if and when you can restart your medicines. He or she will also give you instructions about taking any new medicines. · If you take blood thinners, such as warfarin (Coumadin), clopidogrel (Plavix), or aspirin, be sure to talk to your doctor. He or she will tell you if and when to start taking those medicines again. Make sure that you understand exactly what your doctor wants you to do. · Take pain medicines exactly as directed.   ¨ If the doctor gave you a prescription medicine for pain, take it as prescribed. ¨ If you are not taking a prescription pain medicine, ask your doctor if you can take an over-the-counter medicine. · If your doctor prescribed antibiotics, take them as directed. Do not stop taking them just because you feel better. You need to take the full course of antibiotics. · If you think your pain medicine is making you sick to your stomach:  ¨ Take your medicine after meals (unless your doctor has told you not to). ¨ Ask your doctor for a different pain medicine. Incision care  · You will have a dressing over the cut (incision). A dressing helps the incision heal and protects it. Your doctor will tell you how to take care of this. · You may be wearing a special bra (surgi-bra) that holds your dressing in place after the surgery. Your doctor will tell you when you can stop wearing the bra. Arm exercises  · If you had any lymph nodes removed from under your arm, your doctor will advise you to do arm exercises. Do not do the exercises until your doctor says it is okay. Ice and elevation  · Do not use ice for swelling or pain. · Prop up your arm on a pillow when you sit or lie down. Try to keep your arm above the level of your heart. This will help reduce swelling. Other instructions  · You may have one or more drains in your surgery site. Your doctor will tell you how to take care of them. Follow-up care is a key part of your treatment and safety. Be sure to make and go to all appointments, and call your doctor if you are having problems. It's also a good idea to know your test results and keep a list of the medicines you take. When should you call for help? Call 911 anytime you think you may need emergency care. For example, call if:  · You passed out (lost consciousness). · You have severe trouble breathing. · You have sudden chest pain and shortness of breath, or you cough up blood.   Call your doctor now or seek immediate medical care if:  · Fluid collects under the skin of the surgery site. · Fluid is leaking around the drain, you have a sudden increase in fluid, or you have no new fluid in the drain for 24 hours. · You have sudden swelling of your arm, hands, or fingers. · You have pain that does not go away when you take your pain medicine. · You have loose stitches, your incision comes open, or the skin around the incision turns dark, purple or black. · Bright red blood has soaked through your bandage. · You have signs of infection, such as:  ¨ Increased pain, swelling, warmth, or redness. ¨ Red streaks leading from the incision. ¨ Pus draining from the incision. ¨ A fever. Watch closely for changes in your health, and be sure to contact your doctor if:  · You feel any changes in your other breast during breast self-exams. This includes lumps, skin changes, or nipple drainage. · You have signs of lymphedema, which can happen if lymph nodes were removed. Signs of lymphedema include:  ¨ A feeling of tightness or swelling in or around your arm. ¨ Pain, weakness that keeps getting worse, or a tingling \"pins and needles\" feeling. ¨ Feeling full or heavy in your arm. ¨ Your hand or wrist feeling stiff and hard to move. ¨ Swelling in your fingers. · You feel anxious or depressed, or you have trouble sleeping. · You do not have a bowel movement after taking a laxative. Where can you learn more? Go to http://will-sharon.info/. Enter M481 in the search box to learn more about \"Mastectomy: What to Expect at Home. \"  Current as of: September 22, 2016  Content Version: 11.3  © 7419-5291 [employer]. Care instructions adapted under license by [employer] (which disclaims liability or warranty for this information). If you have questions about a medical condition or this instruction, always ask your healthcare professional. [address] any warranty or liability for your use of this information.        Mastectomy: What to Expect at Home  Your Recovery  Right after the surgery you will probably feel weak, and you may feel sore for 2 to 3 days. You may have a pulling or stretching sensation near or under your arm. You may also have itching, tingling, and throbbing in the area. This will get better in a few days. You will probably have a plastic or rubber tube, called a drain, to collect fluid from under the affected arm on the side of the surgery. Your doctor will remove this when the fluid buildup slows. This can be in a few days or even several weeks. You may be able to go back to your normal routine or return to work in several weeks, but it may take longer. How long it takes you to recover will depend on the type of surgery you had. It also depends on whether you had breast reconstruction at the same time, or if you need other treatment. Your doctor or nurse will be able to give you an idea of what you can expect. When you find out that you have cancer, you may feel many emotions and may need some help coping. This is common. Seek out family, friends, and counselors for support. You also can do things at home to make yourself feel better while you go through treatment. Call the [employer] (1-541.935.5217) or visit its website at [address] [employer] for more information. This care sheet gives you a general idea about how long it will take for you to recover. But each person recovers at a different pace. Follow the steps below to get better as quickly as possible. How can you care for yourself at home? Activity  · Rest when you feel tired. Getting enough sleep will help you recover. After any activity, rest and raise your affected arm for a period of time equal to your activity time. · Try to walk each day. Start by walking a little more than you did the day before. Bit by bit, increase the amount you walk. Walking boosts blood flow and helps prevent pneumonia and constipation.   · Avoid strenuous activities, such as biking, jogging, weightlifting, or aerobic exercise, until your doctor says it is okay. This includes housework, especially if you have to use your affected arm. You will probably be able to do your normal activities in 3 to 6 weeks. Avoid repeated motions with your affected arm, such as weed pulling, window cleaning, or vacuuming, for 6 months. · Avoid lifting anything over 10 to 15 pounds for 4 to 6 weeks. This may include a child, grocery bags, a heavy briefcase or backpack, cat litter or dog food bags, or a vacuum . · Ask your doctor when you can drive again. · You will probably be able to go back to work or your normal routine in 3 to 6 weeks. This depends on the type of work you do and any further treatment. · Your doctor will let you know how soon you can take showers or baths. Diet  · You can eat your normal diet. If your stomach is upset, try bland, low-fat foods like plain rice, broiled chicken, toast, and yogurt. · Drink plenty of fluids (unless your doctor tells you not to). · You may notice that your bowels are not regular right after your surgery. This is common. Try to avoid constipation and straining with bowel movements. Take a fiber supplement such as Citrucel or Metamucil every day. If you have not had a bowel movement after a couple of days, take a mild laxative like Milk of Magnesia or a stool softener like Colace. Medicines  · Your doctor will tell you if and when you can restart your medicines. He or she will also give you instructions about taking any new medicines. · If you take blood thinners, such as warfarin (Coumadin), clopidogrel (Plavix), or aspirin, be sure to talk to your doctor. He or she will tell you if and when to start taking those medicines again. Make sure that you understand exactly what your doctor wants you to do. · Take pain medicines exactly as directed. ¨ If the doctor gave you a prescription medicine for pain, take it as prescribed.   ¨ If you are not taking a prescription pain medicine, ask your doctor if you can take an over-the-counter medicine. · If your doctor prescribed antibiotics, take them as directed. Do not stop taking them just because you feel better. You need to take the full course of antibiotics. · If you think your pain medicine is making you sick to your stomach:  ¨ Take your medicine after meals (unless your doctor has told you not to). ¨ Ask your doctor for a different pain medicine. Incision care  · You will have a dressing over the cut (incision). A dressing helps the incision heal and protects it. Your doctor will tell you how to take care of this. · You may be wearing a special bra (surgi-bra) that holds your dressing in place after the surgery. Your doctor will tell you when you can stop wearing the bra. Arm exercises  · If you had any lymph nodes removed from under your arm, your doctor will advise you to do arm exercises. Do not do the exercises until your doctor says it is okay. Ice and elevation  · Do not use ice for swelling or pain. · Prop up your arm on a pillow when you sit or lie down. Try to keep your arm above the level of your heart. This will help reduce swelling. Other instructions  · You may have one or more drains in your surgery site. Your doctor will tell you how to take care of them. Follow-up care is a key part of your treatment and safety. Be sure to make and go to all appointments, and call your doctor if you are having problems. It's also a good idea to know your test results and keep a list of the medicines you take. When should you call for help? Call 911 anytime you think you may need emergency care. For example, call if:  · You passed out (lost consciousness). · You have severe trouble breathing. · You have sudden chest pain and shortness of breath, or you cough up blood. Call your doctor now or seek immediate medical care if:  · Fluid collects under the skin of the surgery site.   · Fluid is leaking around the drain, you have a sudden increase in fluid, or you have no new fluid in the drain for 24 hours. · You have sudden swelling of your arm, hands, or fingers. · You have pain that does not go away when you take your pain medicine. · You have loose stitches, your incision comes open, or the skin around the incision turns dark, purple or black. · Bright red blood has soaked through your bandage. · You have signs of infection, such as:  ¨ Increased pain, swelling, warmth, or redness. ¨ Red streaks leading from the incision. ¨ Pus draining from the incision. ¨ A fever. Watch closely for changes in your health, and be sure to contact your doctor if:  · You feel any changes in your other breast during breast self-exams. This includes lumps, skin changes, or nipple drainage. · You have signs of lymphedema, which can happen if lymph nodes were removed. Signs of lymphedema include:  ¨ A feeling of tightness or swelling in or around your arm. ¨ Pain, weakness that keeps getting worse, or a tingling \"pins and needles\" feeling. ¨ Feeling full or heavy in your arm. ¨ Your hand or wrist feeling stiff and hard to move. ¨ Swelling in your fingers. · You feel anxious or depressed, or you have trouble sleeping. · You do not have a bowel movement after taking a laxative. Where can you learn more? Go to http://will-sharon.info/. Enter Q249 in the search box to learn more about \"Mastectomy: What to Expect at Home. \"  Current as of: September 22, 2016  Content Version: 11.3  © 2134-1655 [email or web address]. Care instructions adapted under license by [employer] (which disclaims liability or warranty for this information). If you have questions about a medical condition or this instruction, always ask your healthcare professional. [address] any warranty or liability for your use of this information.

## 2017-10-05 NOTE — PROGRESS NOTES
HISTORY OF PRESENT ILLNESS  Juan R Nagel is a 27 y.o. female. HPI  ESTABLISHED patient here for BILATERAL axillary lumps. She is POD#11 bilateral mastectomy with pre-pectoral implant reconstruction, and liposuction to bilateral upper outer quadrant. She feels a hard lump medial to her axillae, RIGHT > LEFT. Pt describes it as 'cording'. The NP for her surgeon suggested the lumps may be seroma. The lower breasts feel heavy. She saw Mehrdad Foy PT yesterday and will follow up with her if she has any ROM restrictions in the future. She has an appointment with her plastic surgeon on the 20th. ROS    Physical Exam   Pulmonary/Chest: Right breast exhibits mass (dense area upper axilla RIGHT greaster than LEFT). Right breast exhibits no inverted nipple, no nipple discharge, no skin change (surgical wounds and drain sites healing well.) and no tenderness. Left breast exhibits no inverted nipple, no mass, no nipple discharge, no skin change and no tenderness. Breasts are symmetrical.       Bilateral skin and nipple sparing mastectomy with implant reconstruction. Lymphadenopathy:     She has no cervical adenopathy. She has no axillary adenopathy. Right: No supraclavicular adenopathy present. Left: No supraclavicular adenopathy present. BREAST ULTRASOUND  Indication: dense areas medial to bilateral axillae, RIGHT > LEFT  Technique: The area was scanned using a high-frequency linear-array near-field transducer  Findings: No seroma fluid seen around the implants. .  No oil cysts upper outer quadrant. Normal subcutaneous tissue  Impression: no abnormality identified   Disposition: No worrisome finding on ultrasound  ASSESSMENT and PLAN    ICD-10-CM ICD-9-CM    1. Breast mass N63.0 611.72      Pt feels density in the upper outer quadrant/medial to the axilla bilaterally. This is where she had liposuction. No evidence of seroma or mass.   I suspect this is dense, edematous tissue after liposuction. These dense areas appear to be secondary to liposuction. Wounds healing well.   PRN follow up

## 2017-10-06 ENCOUNTER — DOCUMENTATION ONLY (OUTPATIENT)
Dept: SURGERY | Age: 32
End: 2017-10-06

## 2017-10-06 NOTE — PROGRESS NOTES
Per patient request I faxed progress notes from 10/5/17 visit with Dr Alice Yost to Dr Delroy Krishnamurthy at fax# [phone].

## 2017-10-10 NOTE — PROGRESS NOTES
I have reviewed the notes, assessments, and/or procedures performed by Dr Roxana العلي, I concur with her documentation of Juvenal Cerrato.

## 2017-10-17 ENCOUNTER — HOSPITAL ENCOUNTER (OUTPATIENT)
Dept: PHYSICAL THERAPY | Age: 32
Discharge: HOME | End: 2017-10-17
Payer: MEDICAID

## 2017-10-17 PROCEDURE — 97110 THERAPEUTIC EXERCISES: CPT | Performed by: PHYSICAL THERAPIST

## 2017-10-17 PROCEDURE — 97140 MANUAL THERAPY 1/> REGIONS: CPT | Performed by: PHYSICAL THERAPIST

## 2017-10-17 NOTE — PROGRESS NOTES
PT ONCOLOGY EVAL/DAILY NOTE    Patient Name: Veleria Halsted  Date:10/17/2017  : 1985  [x]  Patient  Verified  Payor: BLUE CROSS MEDICAID / Plan: [facility] [employer] PLUS / Product Type: Managed Care Medicaid /    In time:2:00  Out time:2:45  Total Treatment Time (min): 45  Total Timed Codes (min): 45     Visit #: 2  of 12    Treatment Area: Breast pain, right [N64.4]  Pain in right shoulder [M25.511]  Pain in left shoulder [M25.512]    SUBJECTIVE    Any medication changes, allergies to medications, adverse drug reactions, diagnosis change, or new procedure performed?: [x] No    [] Yes (see summary sheet for update)    Pain Level : At Present: 0/10, At Best 0/10, At [facility] [place] 10   [] Constant [x]Intermittent    []Improving  [x]Staying the Same  []Getting worse  Current symptoms/Complaints: Pt had an ultrasound day after I evaluated patient. R area to pec was fatty scar tissue so pt is all clear at this time to have PT. Pt states she is having extreme \"tightness\" especially as day progresses. She reports she feels very \"heavy\" in chest and has discomfort around the bottem band area of bra. She meets with her plastic surgeon this Friday and will know more about future course of treatment and any restrictions.    [] Constant [x]Intermittent    []Improving  []Staying the Same  [x]Getting worse       OBJECTIVE      30 min Therapeutic Exercise:  [x] See flow sheet :   Rationale: increase ROM and increase strength to improve the patients ability to perform ADL's required for return to work and/or community       15 min Manual Therapy:  Gentle MFR ant chest, B lat torso, and axillae   Rationale: decrease pain and increase tissue extensibility to effectively use extremity during functional tasks (reaching, grooming, dressing, walking)             With   [x] TE   [] TA   [] neuro   [] other: Patient Education: [x] Review HEP    [x] Progressed/Changed HEP based on: subjective and objective findings  [] positioning   [] body mechanics   [] transfers   [] heat/ice application    [] other:            Pain Level (0-10 scale) post treatment: 0    ASSESSMENT/Changes in Function: Recommending to resume PT following her appt with surgeon on Friday to see if there are any restrictions/precautions. Pt responded well to gentle ROM exercises and purchased a over the door pulley system to use at home.     Patient will benefit from skilled PT services to modify and progress therapeutic interventions, address functional mobility deficits, address ROM deficits, address strength deficits, analyze and address soft tissue restrictions, analyze and cue movement patterns, assess and modify postural abnormalities and instruct in home and community integration to address rehab goals per plan of care             PLAN  []  Upgrade activities as tolerated     []  Continue plan of care  []  Update interventions per flow sheet       []  Discharge due to:_  [x]  Other: Await surgeon's orders as to continuation of PT    [address] [facility], [place] 10/17/2017  1:55 PM

## 2017-10-24 ENCOUNTER — HOSPITAL ENCOUNTER (OUTPATIENT)
Dept: PHYSICAL THERAPY | Age: 32
Discharge: HOME | End: 2017-10-24
Payer: MEDICAID

## 2017-10-24 PROCEDURE — 97140 MANUAL THERAPY 1/> REGIONS: CPT | Performed by: PHYSICAL THERAPIST

## 2017-10-24 PROCEDURE — 97110 THERAPEUTIC EXERCISES: CPT | Performed by: PHYSICAL THERAPIST

## 2017-10-24 NOTE — PROGRESS NOTES
PT ONCOLOGY EVAL/DAILY NOTE    Patient Name: Glen Merlin  Date:10/24/2017  : 1985  [x]  Patient  Verified  Payor: BLUE CROSS MEDICAID / Plan: [facility] [employer] / Product Type: Managed Care Medicaid /    In time:2:00  Out time:2:45  Total Treatment Time (min): 45  Total Timed Codes (min): 45   Visit #: 3 of 12    Treatment Area: Breast pain, right [N64.4]  Pain in right shoulder [M25.511]  Pain in left shoulder [M25.512]    SUBJECTIVE    Any medication changes, allergies to medications, adverse drug reactions, diagnosis change, or new procedure performed?: [x] No    [] Yes (see summary sheet for update)    Pain Level :    At Present: 0/10, At Best 0/10, At Worst 1/10   [] Constant [x]Intermittent    [x]Improving  []Staying the Same  []Getting worse  Current symptoms/Complaints: Pt reports visit with surgeon's office went well, she is having no pain, and able to work full days but still incorporating stretches and motion exercises throughout the day and taking extra strength tylenol 1-2 x /day         [x]Improving  []Staying the Same  []Getting worse       OBJECTIVE      30 min Therapeutic Exercise:  [x] See flow sheet :   Rationale: increase ROM and increase strength to improve the patients ability to perform ADL's required for return to work and/or community      15 min Manual Therapy:  STM to B pectoralis insertion at humerus, MFR UT, lev scap, paracervical.     Rationale: decrease pain, increase ROM, increase tissue extensibility and increase postural awareness to effectively use extremity during functional tasks (reaching, grooming, dressing, walking)               With   [x] TE   [] TA   [] neuro   [] other: Patient Education: [x] Review HEP    [x] Progressed/Changed HEP based on: subjective and objective findings  [] positioning   [] body mechanics   [] transfers   [] heat/ice application    [] other:         Pain Level (0-10 scale) post treatment: 0    ASSESSMENT/Changes in Function: Pt presents today with no pain and has decreased scar tissue at R pec major lateral border.     Patient will benefit from skilled PT services to analyze and cue movement patterns, assess and modify postural abnormalities and instruct in home and community integration to address rehab goals per plan of care             PLAN  [x]  Upgrade activities as tolerated     []  Continue plan of care  []  Update interventions per flow sheet       []  Discharge due to:_  []  Other:_      Gloria AHUMADA, TAMARA 10/24/2017  2:56 PM

## 2017-10-31 ENCOUNTER — APPOINTMENT (OUTPATIENT)
Dept: PHYSICAL THERAPY | Age: 32
End: 2017-10-31
Payer: MEDICAID

## 2017-11-02 ENCOUNTER — APPOINTMENT (OUTPATIENT)
Dept: PHYSICAL THERAPY | Age: 32
End: 2017-11-02

## 2017-11-02 NOTE — PROGRESS NOTES
[address] [place] [facility] Blanca Thorne 56  Phone: [phone]  Fax: [phone]    Discharge Summary  2-15    Patient name: Cathi Astudillo  : 1985  Provider#: [id number]  Referral source: Candance Liming, MD      Medical/Treatment Diagnosis: Breast pain, right [N64.4]  Pain in right shoulder [M25.511]  Pain in left shoulder [M25.512]     Prior Hospitalization: see medical history     Comorbidities: See Plan of Care  Prior Level of Function:See Plan of Care  Medications: Verified on Patient Summary List    Start of Care: 10/4/2017      Onset Date:2017   Visits from Start of Care: 3     Missed Visits: 0  Reporting Period : 10/4/2017 to 10/24/2017      ASSESSMENT/SUMMARY OF CARE: Pt was seen for 3 visits following prepectoral B breast reconstruction with accompanying ROM and postural issues. Pt responded well to PT interventions and has completed all goals       1) Pt will be Independent with skin care routine to decrease risk of infection. MET  2) Pt will be Independent in don/doff/wearing schedule of light compression to decrease discomfort. MET  3) Pt will know which signs and symptoms to report immediately to physician concerning their condition. MET  4) Pt will be Independent with compression management routine consisting of skin care, decongestive exercises, self manual lymphatic stimulation techniques, and don/doff of appropriate compression garment if necessary MET  5) Pt will be Independent with HEP for edema management, utilizing correct breath pattern, strengthening, and motion exercises. MET  6) Pt will utilize correct breath and movement patterns during all ADL's involving reaching above shoulder level without pain. MET  7) Pt will have increased B shoulder AROM elevation and ER by 30-40% with decreased pain by 3-4 levels when using B UE's in all ADL's involving reaching at or above shoulder level. MET      RECOMMENDATIONS:  [x]Discontinue therapy: [x]Patient has reached or is progressing toward set goals      []Patient is non-compliant or has abdicated      []Due to lack of appreciable progress towards set goals      []Other    [address] [place] [address], HARVEYMITCHELL 11/2/2017 11:05 AM

## 2017-11-07 ENCOUNTER — DOCUMENTATION ONLY (OUTPATIENT)
Dept: SURGERY | Age: 32
End: 2017-11-07

## 2017-11-07 ENCOUNTER — APPOINTMENT (OUTPATIENT)
Dept: PHYSICAL THERAPY | Age: 32
End: 2017-11-07

## 2017-11-09 ENCOUNTER — APPOINTMENT (OUTPATIENT)
Dept: PHYSICAL THERAPY | Age: 32
End: 2017-11-09

## 2017-11-14 ENCOUNTER — APPOINTMENT (OUTPATIENT)
Dept: PHYSICAL THERAPY | Age: 32
End: 2017-11-14

## 2017-11-16 ENCOUNTER — APPOINTMENT (OUTPATIENT)
Dept: PHYSICAL THERAPY | Age: 32
End: 2017-11-16

## 2018-03-20 ENCOUNTER — OFFICE VISIT (OUTPATIENT)
Dept: SURGERY | Age: 33
End: 2018-03-20

## 2018-03-20 VITALS
HEIGHT: 64 IN | SYSTOLIC BLOOD PRESSURE: 109 MMHG | DIASTOLIC BLOOD PRESSURE: 56 MMHG | BODY MASS INDEX: 35 KG/M2 | HEART RATE: 94 BPM | WEIGHT: 205 LBS

## 2018-03-20 DIAGNOSIS — Z80.3 FAMILY HX-BREAST MALIGNANCY: ICD-10-CM

## 2018-03-20 DIAGNOSIS — Z15.09 BRCA2 POSITIVE: Primary | ICD-10-CM

## 2018-03-20 DIAGNOSIS — N63.10 BREAST MASS, RIGHT: ICD-10-CM

## 2018-03-20 DIAGNOSIS — Z15.01 BRCA2 POSITIVE: Primary | ICD-10-CM

## 2018-03-20 NOTE — PROGRESS NOTES
HISTORY OF PRESENT ILLNESS  Timoteo Juarez is a 34 y.o. female. HPI ESTABLISHED patient presents with lumps in the RIGHT breast and axilla. She had  prophylactic mastectomies with prepectoral reconstruction 9/22/2017 in [facility] Last time she was here she was told it was fat necrosis. She reports no concerns in the LEFT breast.   The patient is 20 weeks pregnant and having a boy. Bilateral skin and nipple sparing mastectomy with prepectoral implant reconstruction  5/2017 BRCA 2 positive    ROS    Physical Exam   Pulmonary/Chest: Right breast exhibits mass (nodular tissue UOQ/axilla. no discrete mass. ). Right breast exhibits no inverted nipple, no nipple discharge, no skin change and no tenderness. Left breast exhibits no inverted nipple, no mass, no nipple discharge, no skin change and no tenderness. Breasts are symmetrical (bilateral prepectoral implants). Lymphadenopathy:     She has no cervical adenopathy. She has no axillary adenopathy. Right: No supraclavicular adenopathy present. Left: No supraclavicular adenopathy present. BREAST ULTRASOUND  Indication: Right Breast/axillary nodularity. Bilateral mastectomy with prepectoral implants  Technique:  Bilateral breast ultrasound was performed using a high-frequency linear-array near-field transducer  Findings: No abnormal mass, lesion, or shadowing noted. No bilateral axillary lymphadenopathy  7mm normal RIGHT axillary lymph node. Impression: Normal subcutaneous tissue after bilateral mastectomy/implants  Disposition: No worrisome finding on ultrasound  ASSESSMENT and PLAN    ICD-10-CM ICD-9-CM    1. BRCA2 positive Z15.01 V84.01     Z15.02     2. Family hx-breast malignancy Z80.3 V16.3    3. Breast mass, right N63.10 611.72      1. RIGHT axilla/UOQ felt nodular. No fat necrosis. Normal RIGHT axillary lymph node  2. Discussed surveillance. She is BRCA positive. Yearly follow up. PRN ultrasound.   MRI every 3 years

## 2018-03-20 NOTE — COMMUNICATION BODY
HISTORY OF PRESENT ILLNESS  Lane Kerr is a 34 y.o. female. HPI ESTABLISHED patient presents with lumps in the RIGHT breast and axilla. She had  prophylactic mastectomies with prepectoral reconstruction 9/22/2017 in [facility]. Last time she was here she was told it was fat necrosis. She reports no concerns in the LEFT breast.   The patient is 20 weeks pregnant and having a boy. Bilateral skin and nipple sparing mastectomy with prepectoral implant reconstruction  5/2017 BRCA 2 positive    ROS    Physical Exam   Pulmonary/Chest: Right breast exhibits mass (nodular tissue UOQ/axilla. no discrete mass. ). Right breast exhibits no inverted nipple, no nipple discharge, no skin change and no tenderness. Left breast exhibits no inverted nipple, no mass, no nipple discharge, no skin change and no tenderness. Breasts are symmetrical (bilateral prepectoral implants). Lymphadenopathy:     She has no cervical adenopathy. She has no axillary adenopathy. Right: No supraclavicular adenopathy present. Left: No supraclavicular adenopathy present. BREAST ULTRASOUND  Indication: Right Breast/axillary nodularity. Bilateral mastectomy with prepectoral implants  Technique:  Bilateral breast ultrasound was performed using a high-frequency linear-array near-field transducer  Findings: No abnormal mass, lesion, or shadowing noted. No bilateral axillary lymphadenopathy  7mm normal RIGHT axillary lymph node. Impression: Normal subcutaneous tissue after bilateral mastectomy/implants  Disposition: No worrisome finding on ultrasound  ASSESSMENT and PLAN    ICD-10-CM ICD-9-CM    1. BRCA2 positive Z15.01 V84.01     Z15.02     2. Family hx-breast malignancy Z80.3 V16.3    3. Breast mass, right N63.10 611.72      1. RIGHT axilla/UOQ felt nodular. No fat necrosis. Normal RIGHT axillary lymph node  2. Discussed surveillance. She is BRCA positive. Yearly follow up. PRN ultrasound.   MRI every 3 years

## 2018-03-20 NOTE — MR AVS SNAPSHOT
Rebecca Pulse 
 
 
 [place] [address] [facility] [address] [facility] 
[phone] Patient: Taqueria Nolen MRN: [id number] :1985 Visit Information Date & Time Provider Department Dept. Phone Encounter #  
 3/20/2018  8:30 AM Luz Mcarthur MD [facility] [phone] [id number] Upcoming Health Maintenance Date Due DTaP/Tdap/Td series (1 - Tdap) 2005 PAP AKA CERVICAL CYTOLOGY 2005 Influenza Age 5 to Adult 2017 Allergies as of 3/20/2018  Review Complete On: 3/20/2018 By: Luz Mcarthur MD  
  
 Severity Noted Reaction Type Reactions Morphine  2017    Nausea and Vomiting  
 Sulfa (Sulfonamide Antibiotics)  2017    Other (comments) Hallucinations Current Immunizations  Never Reviewed No immunizations on file. Not reviewed this visit You Were Diagnosed With   
  
 Codes Comments Family hx-breast malignancy     ICD-10-CM: Z80.3 ICD-9-CM: V16.3 Vitals BP Pulse Height(growth percentile) Weight(growth percentile) BMI OB Status 109/56 94 5' 4\" (1.626 m) 205 lb (93 kg) 35.19 kg/m2 Having regular periods Smoking Status Never Smoker BMI and BSA Data Body Mass Index Body Surface Area  
 35.19 kg/m 2 2.05 m 2 Your Updated Medication List  
  
   
This list is accurate as of 3/20/18  3:41 PM.  Always use your most recent med list.  
  
  
  
  
 cetirizine 10 mg tablet Commonly known as:  ZYRTEC  
TAKE 1 TABLET BY MOUTH AT BEDTIME  
  
 FISH OIL PO Take  by mouth. ibuprofen 200 mg tablet Commonly known as:  MOTRIN Take  by mouth every six (6) hours as needed for Pain. MAGNESIUM CARBONATE PO Take 300 mg by mouth. PNV No12-Iron-FA-DSS-OM-3 29 mg iron-1 mg -50 mg Cpkd Take  by mouth. PROBIOTIC 4X 10-15 mg Tbec Generic drug:  B.infantis-B.ani-B.long-B.bifi Take  by mouth.   
  
 PROBIOTIC FORMULA PO  
 Take  by mouth. VITAMIN B-6 50 mg tablet Generic drug:  pyridoxine (vitamin B6) Take  by mouth daily. VITAMIN C PO Take 4,000 mg by mouth. VITAMIN D3 1,000 unit Cap Generic drug:  cholecalciferol Take 5,000 Units by mouth daily. Introducing [facility]! Ayana Caldera introduces Teacher[employer] patient portal. Now you can access parts of your medical record, email your doctor's office, and request medication refills online. 1. In your internet browser, go to [email or web address] 2. Click on the First Time User? Click Here link in the Sign In box. You will see the New Member Sign Up page. 3. Enter your [employer] Access Code exactly as it appears below. You will not need to use this code after youve completed the sign-up process. If you do not sign up before the expiration date, you must request a new code. · Teacher[employer] Access Code: [email or web address] Expires: 6/18/2018  3:40 PM 
 
4. Enter the last four digits of your Social Security Number (xxxx) and Date of Birth (mm/dd/yyyy) as indicated and click Submit. You will be taken to the next sign-up page. 5. Create a Teacher[employer] ID. This will be your Teacher[employer] login ID and cannot be changed, so think of one that is secure and easy to remember. 6. Create a Teacher[employer] password. You can change your password at any time. 7. Enter your Password Reset Question and Answer. This can be used at a later time if you forget your password. 8. Enter your e-mail address. You will receive e-mail notification when new information is available in [address] 9. Click Sign Up. You can now view and download portions of your medical record. 10. Click the Download Summary menu link to download a portable copy of your medical information. If you have questions, please visit the Frequently Asked Questions section of the [employer] website. Remember, Teacher[employer] is NOT to be used for urgent needs. For medical emergencies, dial 911. Now available from your iPhone and Android! Please provide this summary of care documentation to your next provider. Your primary care clinician is listed as Brandon Maradiaga. If you have any questions after today's visit, please call [phone].

## 2018-11-21 ENCOUNTER — HOSPITAL ENCOUNTER (EMERGENCY)
Age: 33
Discharge: HOME | End: 2018-11-21
Attending: EMERGENCY MEDICINE
Payer: COMMERCIAL

## 2018-11-21 VITALS
HEART RATE: 80 BPM | WEIGHT: 200 LBS | SYSTOLIC BLOOD PRESSURE: 116 MMHG | TEMPERATURE: 98.1 F | OXYGEN SATURATION: 98 % | DIASTOLIC BLOOD PRESSURE: 69 MMHG | HEIGHT: 64 IN | BODY MASS INDEX: 34.15 KG/M2 | RESPIRATION RATE: 17 BRPM

## 2018-11-21 DIAGNOSIS — G47.00 INSOMNIA, UNSPECIFIED TYPE: Primary | ICD-10-CM

## 2018-11-21 PROCEDURE — 99281 EMR DPT VST MAYX REQ PHY/QHP: CPT

## 2018-11-21 NOTE — DISCHARGE INSTRUCTIONS

## 2018-11-21 NOTE — ED TRIAGE NOTES
Triage: Had a baby August 1st vaginal delivery did have a spinal leak but was fixed with blood patch. Went to back October 15 th and has been having insomnia since then. Has tried Abien 10 mg, Zanax, Melotonin, without success. Was started on Lexapro yesterday for Insomnia and still cannot sleep. \" I need something to help me sleep\".

## 2018-11-21 NOTE — ED PROVIDER NOTES
29yo F with pmh of PCOS who presents with insomnia. Pt had baby on 8/1 and after going back to work has been unable to go to sleep. Pt reports no sleep in past 3 nights. Her baby goes to bed well at 7pm and the patient reports feeling tired but is unable to fall asleep. Has tired multiple medications including benadryl, ambien, xanax, zoloft, Lexapro, Melatonin but nothing has helped except for Ambien for a brief period. Pt reports seeing her OB/gyn and PCP who could not help her. She also reports good sleep hygiene practices without improvement. Denies any pain, sob, fever, cough, n/v, c/d. Past Medical History:  
Diagnosis Date  PCOS (polycystic ovarian syndrome) Past Surgical History:  
Procedure Laterality Date  BREAST SURGERY PROCEDURE UNLISTED    
 bilat Mastectomy  HX PARATHYROIDECTOMY History reviewed. No pertinent family history. Social History Socioeconomic History  Marital status: SINGLE Spouse name: Not on file  Number of children: Not on file  Years of education: Not on file  Highest education level: Not on file Social Needs  Financial resource strain: Not on file  Food insecurity - worry: Not on file  Food insecurity - inability: Not on file  Transportation needs - medical: Not on file  Transportation needs - non-medical: Not on file Occupational History  Not on file Tobacco Use  Smoking status: Never Smoker  Smokeless tobacco: Never Used Substance and Sexual Activity  Alcohol use: Yes Comment: rarely  Drug use: No  
 Sexual activity: Not on file Other Topics Concern  Not on file Social History Narrative  Not on file ALLERGIES: Sulfa (sulfonamide antibiotics) and Morphine Review of Systems Constitutional: Negative for fever. HENT: Negative for facial swelling. Eyes: Negative for visual disturbance. Respiratory: Negative for chest tightness. Cardiovascular: Negative for chest pain. Gastrointestinal: Negative for abdominal pain. Genitourinary: Negative for difficulty urinating and dysuria. Musculoskeletal: Negative for arthralgias. Skin: Negative for rash. Neurological: Negative for dizziness. Hematological: Negative for adenopathy. Psychiatric/Behavioral: Negative for suicidal ideas. Vitals:  
 11/21/18 2778 BP: 116/69 Pulse: 80 Resp: 17 Temp: 98.1 °F (36.7 °C) SpO2: 98% Weight: 90.7 kg (200 lb) Height: 5' 4\" (1.626 m) Physical Exam  
Constitutional: She is oriented to person, place, and time. She appears well-developed and well-nourished. No distress. HENT:  
Head: Normocephalic and atraumatic. Eyes: Conjunctivae are normal. Pupils are equal, round, and reactive to light. No scleral icterus. Neck: Normal range of motion. Neck supple. Cardiovascular: Normal rate. No murmur heard. Pulmonary/Chest: Effort normal. No respiratory distress. Abdominal: She exhibits no distension. Musculoskeletal: Normal range of motion. She exhibits no edema. Neurological: She is alert and oriented to person, place, and time. Skin: Skin is warm and dry. No rash noted. Nursing note and vitals reviewed. MDM Number of Diagnoses or Management Options Insomnia, unspecified type:  
Diagnosis management comments: A:  Insomnia. No other interventions that I am aware of to try. Offered to give information to f/u with psychiatrist but patient declined. She left ED prior to completing registration and without receiving discharge paperwork. Procedures

## 2019-04-22 ENCOUNTER — OFFICE VISIT (OUTPATIENT)
Dept: SLEEP MEDICINE | Age: 34
End: 2019-04-22

## 2019-04-22 VITALS
SYSTOLIC BLOOD PRESSURE: 102 MMHG | HEART RATE: 73 BPM | HEIGHT: 64 IN | DIASTOLIC BLOOD PRESSURE: 66 MMHG | BODY MASS INDEX: 35.17 KG/M2 | WEIGHT: 206 LBS | OXYGEN SATURATION: 100 %

## 2019-04-22 DIAGNOSIS — G47.33 OSA (OBSTRUCTIVE SLEEP APNEA): Primary | ICD-10-CM

## 2019-04-22 DIAGNOSIS — Z86.59 H/O ANXIETY DISORDER: ICD-10-CM

## 2019-04-22 RX ORDER — ZOLPIDEM TARTRATE 5 MG/1
TABLET ORAL
Refills: 0 | COMMUNITY
Start: 2019-03-18 | End: 2022-04-12 | Stop reason: ALTCHOICE

## 2019-04-22 NOTE — PATIENT INSTRUCTIONS
[address] [facility] [place]. [place], [address]  Tel.  [phone]  Fax. [address] [facility]  [place], [address] [facility]  Tel.  [phone]  Fax. [phone] [address]  Tel.  [phone]  Fax. [phone]     Sleep Apnea: After Your Visit  Your Care Instructions  Sleep apnea occurs when you frequently stop breathing for 10 seconds or longer during sleep. It can be mild to severe, based on the number of times per hour that you stop breathing or have slowed breathing. Blocked or narrowed airways in your nose, mouth, or throat can cause sleep apnea. Your airway can become blocked when your throat muscles and tongue relax during sleep. Sleep apnea is common, occurring in 1 out of 20 individuals. Individuals having any of the following characteristics should be evaluated and treated right away due to high risk and detrimental consequences from untreated sleep apnea:  1. Obesity  2. Congestive Heart failure  3. Atrial Fibrillation  4. Uncontrolled Hypertension  5. Type II Diabetes  6. Night-time Arrhythmias  7. Stroke  8. Pulmonary Hypertension  9. High-risk Driving Populations (pilots, truck drivers, etc.)  10. Patients Considering Weight-loss Surgery    How do you know you have sleep apnea? You probably have sleep apnea if you answer 'yes' to 3 or more of the following questions:  S - Have you been told that you Snore? T - Are you often Tired during the day? O - Has anyone Observed you stop breathing while sleeping? P- Do you have (or are being treated for) high blood Pressure? B - Are you obese (Body Mass Index > 35)? A - Is your Age 48years old or older? N - Is your Neck size greater than 16 inches? G - Are you male Gender? A sleep physician can prescribe a breathing device that prevents tissues in the throat from blocking your airway.  Or your doctor may recommend using a dental device (oral breathing device) to help keep your airway open. In some cases, surgery may be needed to remove enlarged tissues in the throat. Follow-up care is a key part of your treatment and safety. Be sure to make and go to all appointments, and call your doctor if you are having problems. It's also a good idea to know your test results and keep a list of the medicines you take. How can you care for yourself at home? · Lose weight, if needed. It may reduce the number of times you stop breathing or have slowed breathing. · Go to bed at the same time every night. · Sleep on your side. It may stop mild apnea. If you tend to roll onto your back, sew a pocket in the back of your pajama top. Put a tennis ball into the pocket, and stitch the pocket shut. This will help keep you from sleeping on your back. · Avoid alcohol and medicines such as sleeping pills and sedatives before bed. · Do not smoke. Smoking can make sleep apnea worse. If you need help quitting, talk to your doctor about stop-smoking programs and medicines. These can increase your chances of quitting for good. · Prop up the head of your bed 4 to 6 inches by putting bricks under the legs of the bed. · Treat breathing problems, such as a stuffy nose, caused by a cold or allergies. · Use a continuous positive airway pressure (CPAP) breathing machine if lifestyle changes do not help your apnea and your doctor recommends it. The machine keeps your airway from closing when you sleep. · If CPAP does not help you, ask your doctor whether you should try other breathing machines. A bilevel positive airway pressure machine has two types of air pressureâone for breathing in and one for breathing out. Another device raises or lowers air pressure as needed while you breathe. · If your nose feels dry or bleeds when using one of these machines, talk with your doctor about increasing moisture in the air. A humidifier may help.   · If your nose is runny or stuffy from using a breathing machine, talk with your doctor about using decongestants or a corticosteroid nasal spray. When should you call for help? Watch closely for changes in your health, and be sure to contact your doctor if:  · You still have sleep apnea even though you have made lifestyle changes. · You are thinking of trying a device such as CPAP. · You are having problems using a CPAP or similar machine. Where can you learn more? Go to APJeT. Enter P600 in the search box to learn more about \"Sleep Apnea: After Your Visit. \"   © 5896-2374 Healthwise, Incorporated. Care instructions adapted under license by [facility] [employer] (which disclaims liability or warranty for this information). This care instruction is for use with your licensed healthcare professional. If you have questions about a medical condition or this instruction, always ask your healthcare professional. Cloyce Pellet any warranty or liability for your use of this information. PROPER SLEEP HYGIENE    What to avoid  · Do not have drinks with caffeine, such as coffee or black tea, for 8 hours before bed. · Do not smoke or use other types of tobacco near bedtime. Nicotine is a stimulant and can keep you awake. · Avoid drinking alcohol late in the evening, because it can cause you to wake in the middle of the night. · Do not eat a big meal close to bedtime. If you are hungry, eat a light snack. · Do not drink a lot of water close to bedtime, because the need to urinate may wake you up during the night. · Do not read or watch TV in bed. Use the bed only for sleeping and sexual activity. What to try  · Go to bed at the same time every night, and wake up at the same time every morning. Do not take naps during the day. · Keep your bedroom quiet, dark, and cool. · Get regular exercise, but not within 3 to 4 hours of your bedtime. .  · Sleep on a comfortable pillow and mattress.   · If watching the clock makes you anxious, turn it facing away from you so you cannot see the time. · If you worry when you lie down, start a worry book. Well before bedtime, write down your worries, and then set the book and your concerns aside. · Try meditation or other relaxation techniques before you go to bed. · If you cannot fall asleep, get up and go to another room until you feel sleepy. Do something relaxing. Repeat your bedtime routine before you go to bed again. · Make your house quiet and calm about an hour before bedtime. Turn down the lights, turn off the TV, log off the computer, and turn down the volume on music. This can help you relax after a busy day. Drowsy Driving  The [address] [place] [address] Road Traffic Safety Administration cites drowsiness as a causing factor in more than 144,512 police reported crashes annually, resulting in 76,000 injuries and 1,500 deaths. Other surveys suggest 55% of people polled have driven while drowsy in the past year, 23% had fallen asleep but not crashed, 3% crashed, and 2% had and accident due to drowsy driving. Who is at risk? Young Drivers: One study of drowsy driving accidents states that 55% of the drivers were under 25 years. Of those, 75% were male. Shift Workers and Travelers: People who work overnight or travel across time zones frequently are at higher risk of experiencing Circadian Rhythm Disorders. They are trying to work and function when their body is programed to sleep. Sleep Deprived: Lack of sleep has a serious impact on your ability to pay attention or focus on a task. Consistently getting less than the average of 8 hours your body needs creates partial or cumulative sleep deprivation. Untreated Sleep Disorders: Sleep Apnea, Narcolepsy, R.L.S., and other sleep disorders (untreated) prevent a person from getting enough restful sleep. This leads to excessive daytime sleepiness and increases the risk for drowsy driving accidents by up to 7 times.   Medications / Alcohol: Even over the counter medications can cause drowsiness. Medications that impair a drivers attention should have a warning label. Alcohol naturally makes you sleepy and on its own can cause accidents. Combined with excessive drowsiness its effects are amplified. Signs of Drowsy Driving:   * You don't remember driving the last few miles   * You may drift out of your lara   * You are unable to focus and your thoughts wander   * You may yawn more often than normal   * You have difficulty keeping your eyes open / nodding off   * Missing traffic signs, speeding, or tailgating  Prevention-   Good sleep hygiene, lifestyle and behavioral choices have the most impact on drowsy driving. There is no substitute for sleep and the average person requires 8 hours nightly. If you find yourself driving drowsy, stop and sleep. Consider the sleep hygiene tips provided during your visit as well. Medication Refill Policy: Refills for all medications require 1 week advance notice. Please have your pharmacy fax a refill request. We are unable to fax, or call in \"controled substance\" medications and you will need to pick these prescriptions up from our office. SensingSt[employer] Activation    Thank you for requesting access to [employer]. Please follow the instructions below to securely access and download your online medical record. [employer] allows you to send messages to your doctor, view your test results, renew your prescriptions, schedule appointments, and more. How Do I Sign Up? 1. In your internet browser, go to [email or web address]. 2. Click on the First Time User? Click Here link in the Sign In box. You will see the New Member Sign Up page. 3. Enter your S[employer]St[employer] Access Code exactly as it appears below. You will not need to use this code after youve completed the sign-up process. If you do not sign up before the expiration date, you must request a new code.     SensingStrip Access Code: [email or web address]  Expires: 6/6/2019  8:51 AM (This is the date your [email or web address] access code will )    4. Enter the last four digits of your Social Security Number (xxxx) and Date of Birth (mm/dd/yyyy) as indicated and click Submit. You will be taken to the next sign-up page. 5. Create a Terapiot ID. This will be your [email or web address] login ID and cannot be changed, so think of one that is secure and easy to remember. 6. Create a [email or web address] password. You can change your password at any time. 7. Enter your Password Reset Question and Answer. This can be used at a later time if you forget your password. 8. Enter your e-mail address. You will receive e-mail notification when new information is available in [address] 9. Click Sign Up. You can now view and download portions of your medical record. 10. Click the Download Summary menu link to download a portable copy of your medical information. Additional Information    If you have questions, please call [phone]. Remember, [email or web address] is NOT to be used for urgent needs. For medical emergencies, dial 911.

## 2019-04-22 NOTE — PROGRESS NOTES
217 [facility] [place]. [place], [address]  Tel.  [phone]  Fax. [address] [facility] [address]  [place], [address] [facility]  Tel.  [phone]  Fax. [phone] [address] [facility] [address] Blair   Tel.  [phone]  Fax. [phone]         Subjective:      Jonah Gillette is an 28 y.o. female referred for evaluation for a sleep disorder. She complains of snoring associated with awakening in the middle of the night because of no specific reason and then difficulties returning to sleep. She has been taking Ambien for the past 2 months and is currently weaning off this medication while working with Dr. Yoli Khalil regarding CBT-I. Symptoms began 6 months ago, stable / improving since that time. She usually can fall asleep in 10 minutes. Family or house members note snoring. She denies completely or partially paralyzed while falling asleep or waking up. Jonah Gillette does wake up frequently at night. She is bothered by waking up too early and left unable to get back to sleep. She actually sleeps about 6 hours at night and wakes up about 3 times during the night. She   work shifts:  .   Mandi Stovall indicates she does get too little sleep at night. Her bedtime is 2200(9.30 - 10). She awakens at 0530. She does not take naps. She has the following observed behaviors: Loud snoring, Grinding teeth;  .   Other remarks:   She has had a HSAT which was indicative of hypopnea and confirmatory attended study request.    Dixon Sleepiness Score: 3     Allergies   Allergen Reactions    Sulfa (Sulfonamide Antibiotics) Anaphylaxis    Morphine Nausea and Vomiting    Morphine Nausea Only    Sulfa (Sulfonamide Antibiotics) Other (comments)     Hallucinations         Current Outpatient Medications:     OTHER, , Disp: , Rfl:     PHOSPHATIDYL SERINE, BULK,, phosphatidyl serine (bulk), Disp: , Rfl:     prenatal vit-iron fumarate-fa (PRENATAL VITAMIN) 27 mg iron- 0.8 mg tab tablet, Prenatal Vitamin 27 mg iron-0.8 mg tablet, Disp: , Rfl:     zolpidem (AMBIEN) 5 mg tablet, , Disp: , Rfl: 0    ibuprofen (MOTRIN) 200 mg tablet, Take  by mouth every six (6) hours as needed for Pain., Disp: , Rfl:     cholecalciferol (VITAMIN D3) 1,000 unit cap, Take 5,000 Units by mouth daily. , Disp: , Rfl:     DOCOSAHEXANOIC ACID/EPA (FISH OIL PO), Take  by mouth., Disp: , Rfl:     B.ANI/L. ACI/L.SAL/L. PLAN/L.JESSICA (PROBIOTIC FORMULA PO), Take  by mouth., Disp: , Rfl:     cetirizine (ZYRTEC) 10 mg tablet, TAKE 1 TABLET BY MOUTH AT BEDTIME, Disp: , Rfl: 1    b complex vitamins tablet, Take 1 tablet by mouth daily. , Disp: , Rfl:     magnesium 250 mg tab, Take  by mouth., Disp: , Rfl:     melatonin 1 mg tablet, Take  by mouth., Disp: , Rfl:     PNV No12-Iron-FA-DSS-OM-3 29 mg iron-1 mg -50 mg CPKD, Take  by mouth., Disp: , Rfl:     B.infantis-B.ani-B.long-B.bifi (PROBIOTIC 4X) 10-15 mg TbEC, Take  by mouth., Disp: , Rfl:     pyridoxine, vitamin B6, (VITAMIN B-6) 50 mg tablet, Take  by mouth daily. , Disp: , Rfl:     ASCORBATE CALCIUM (VITAMIN C PO), Take 4,000 mg by mouth., Disp: , Rfl:     MAGNESIUM CARBONATE PO, Take 300 mg by mouth., Disp: , Rfl:     ascorbic acid (VITAMIN C) 250 mg tablet, Take  by mouth., Disp: , Rfl:     Biotin 2,500 mcg cap, Take  by mouth., Disp: , Rfl:     calcium 500 mg tab, Take  by mouth., Disp: , Rfl:     metFORMIN (GLUMETZA) 1,000 mg TG24 24 hour tablet, Take  by mouth., Disp: , Rfl:     B.infantis-B.ani-B.long-B.bifi (PROBIOTIC 4X) 10-15 mg TbEC, Take  by mouth., Disp: , Rfl:     LEVONORGESTREL (MIRENA IU), by IntraUTERine route., Disp: , Rfl:     HYDROcodone-acetaminophen (NORCO) 7.5-325 mg per tablet, Take 1 tablet by mouth every six (6) hours as needed for Pain. Do not drive for 6 hours after taking, Disp: 40 tablet, Rfl: 0     She  has a past medical history of PCOS (polycystic ovarian syndrome), Psychiatric disorder, and Thyroid disease.     She  has a past surgical history that includes hx heent (09/2014); hx hernia repair (1986); pr breast surgery procedure unlisted; and hx parathyroidectomy. She family history includes Diabetes in her mother; Heart Disease in her father; Hypertension in her father; No Known Problems in her sister. She  reports that she has never smoked. She has never used smokeless tobacco. She reports that she drinks alcohol. She reports that she does not use drugs. Review of Systems:  Constitutional: significant weight gain during pregnancy  Eyes:  No blurred vision  CVS:  No significant chest pain  Pulm:  No significant shortness of breath  GI:  No significant nausea or vomiting  :  No significant nocturia  Musculoskeletal:  No significant joint pain at night  Skin:  No significant rashes  Neuro:  No significant dizziness   Psych:  No active mood issues    Sleep Review of Systems: notable for no difficulty falling asleep; frequent awakenings at night;  regular dreaming noted; no nightmares ; no early morning headaches; no memory problems; no concentration issues; no history of any automobile or occupational accidents due to daytime drowsiness. Objective:     Visit Vitals  /66   Pulse 73   Ht 5' 4\" (1.626 m)   Wt 206 lb (93.4 kg)   SpO2 100%   BMI 35.36 kg/m²         General:   Not in acute distress   Eyes:  Anicteric sclerae, no obvious strabismus   Nose:  No obvious nasal septum deviation    Oropharynx:   Class 4 oropharyngeal outlet, thick tongue base, uvula could not be seen due to low-lying soft palate, narrow tonsilo-pharyngeal pilars   Tonsils:   tonsils are not seen due to low-lying soft palate   Neck:   Neck circ. in \"inches\": 14.25; midline trachea   Chest/Lungs:  Equal lung expansion, clear on auscultation    CVS:  Normal rate, regular rhythm; no JVD   Skin:  Warm to touch; no obvious rashes   Neuro:  No focal deficits ; no obvious tremor    Psych:  Normal affect,  normal countenance;          Assessment:       ICD-10-CM ICD-9-CM    1. TENZIN (obstructive sleep apnea) G47.33 327.23 POLYSOMNOGRAPHY 1 NIGHT   2. H/O anxiety disorder Z86.59 V11.8    3. BMI 35.0-35.9,adult Z68.35 V85.35          Plan:     * The patient currently has a Low Risk for having sleep apnea. STOP-BANG score 2.  * Sleep testing was ordered for initial evaluation. * She was provided information on sleep apnea including coresponding risk factors and the importance of proper treatment. * Treatment options if indicated were reviewed today. Patient agrees to a trial of OAT / PAP therapy if indicated. * Counseling was provided regarding proper sleep hygiene (including effect of light on sleep), paradoxical intention, stimulus control, sleep environment safety and safe driving. * Effect of sleep disturbance on weight was reviewed. We have recommended a dedicated weight loss through appropriate diet and an exercise regiment as significant weight reduction has been shown to reduce severity of obstructive sleep apnea. * Patient agrees to telephone [phone]  follow-up by myself or lead sleep technologist shortly after sleep study to review results and plan final management.     (patient has given permission for a message to be left regarding test results and further management if patient cannot be cannot be reached directly). Thank you for allowing us to participate in your patient's medical care. We'll keep you updated on these investigations. Yessy Butts MD, FAASM  Electronically signed.  04/22/19

## 2020-07-20 NOTE — PROGRESS NOTES
Faxed signed PT plan of care to Physical Therapy at [facility] at [phone]. Copy placed up front to be scanned in patient's chart.
stated

## 2020-08-04 ENCOUNTER — OFFICE VISIT (OUTPATIENT)
Dept: SURGERY | Age: 35
End: 2020-08-04
Payer: COMMERCIAL

## 2020-08-04 VITALS
HEART RATE: 61 BPM | SYSTOLIC BLOOD PRESSURE: 113 MMHG | BODY MASS INDEX: 35.17 KG/M2 | WEIGHT: 206 LBS | DIASTOLIC BLOOD PRESSURE: 76 MMHG | HEIGHT: 64 IN | TEMPERATURE: 98.8 F

## 2020-08-04 DIAGNOSIS — Z15.01 BRCA2 GENE MUTATION POSITIVE: Primary | ICD-10-CM

## 2020-08-04 DIAGNOSIS — Z15.09 BRCA2 GENE MUTATION POSITIVE: Primary | ICD-10-CM

## 2020-08-04 PROCEDURE — 76641 ULTRASOUND BREAST COMPLETE: CPT | Performed by: SURGERY

## 2020-08-04 PROCEDURE — 99213 OFFICE O/P EST LOW 20 MIN: CPT | Performed by: SURGERY

## 2020-08-04 NOTE — PROGRESS NOTES
HISTORY OF PRESENT ILLNESS  Lisa Mccloud is a 27 y.o. female. HPI ESTABLISHED patient here today for complaints of LEFT nipple sensitivity. The patient  had  prophylactic mastectomies with prepectoral reconstruction 9/22/2017 in [facility]. Private practice, family law  5/2017 BRCA 2 positive  9/2017 Bilateral skin and nipple sparing mastectomy with prepectoral implant reconstruction    FH is significant for her paternal grandmother who was diagnosed with breast CA age 28, a maternal grandmother probably had breast CA age [de-identified]. The patient's father and sister are BRCA positive. Sister is scheduled for bilateral mastectomies 2020. ROS    Physical Exam  Chest:      Breasts: Breasts are asymmetrical (RIGHT nipple higher than LEFT. otherwise good symmetry). Right: No inverted nipple, mass, nipple discharge, skin change or tenderness. Left: No inverted nipple, mass, nipple discharge, skin change or tenderness. Comments: Bilateral nipple sparing mastectomy with prepectoral implants. Abdomina scar: diaphragmatic hernia repair, age 1  Lymphadenopathy:      Upper Body:      Right upper body: No supraclavicular or axillary adenopathy. Left upper body: No supraclavicular or axillary adenopathy. BILATERAL BREAST ULTRASOUND  Indication: BILATERAL breast mastectomy with reconstruction  Technique:  Bilateral 4 quadrant ultrasound and axillae was performed using a high-frequency linear-array near-field transducer  Findings: No abnormal mass, lesion, or shadowing noted. No cysts  Impression: Normal breast tissue   Disposition: No worrisome finding on ultrasound    ASSESSMENT and PLAN    ICD-10-CM ICD-9-CM    1. BRCA2 gene mutation positive  Z15.01 V84.01     Z15.09     1. Bilateral mastectomy for BRCA 2 mutation  2. EWELINA  3. Pt is considering a revision. The RIGHT nipple is higher than the LEFT. She has thought about ARGENTINA reconstruction. 4. Sister is also BRCA2 positive.    She had a breast reduction and then will have ARGENTINA reconstruction. 5. Discussed breast MRI if the chest wall doesn't feel right.

## 2021-01-05 ENCOUNTER — OFFICE VISIT (OUTPATIENT)
Dept: SURGERY | Age: 36
End: 2021-01-05
Payer: COMMERCIAL

## 2021-01-05 VITALS
HEART RATE: 78 BPM | BODY MASS INDEX: 34.15 KG/M2 | DIASTOLIC BLOOD PRESSURE: 64 MMHG | WEIGHT: 200 LBS | SYSTOLIC BLOOD PRESSURE: 114 MMHG | TEMPERATURE: 98.7 F | HEIGHT: 64 IN

## 2021-01-05 DIAGNOSIS — N63.20 BREAST MASS, LEFT: Primary | ICD-10-CM

## 2021-01-05 PROCEDURE — 99213 OFFICE O/P EST LOW 20 MIN: CPT | Performed by: SURGERY

## 2021-01-05 PROCEDURE — 76642 ULTRASOUND BREAST LIMITED: CPT | Performed by: SURGERY

## 2021-01-05 NOTE — PATIENT INSTRUCTIONS
Eating Healthy Foods: Care Instructions Your Care Instructions Eating healthy foods can help lower your risk for disease. Healthy food gives you energy and keeps your heart strong, your brain active, your muscles working, and your bones strong. A healthy diet includes a variety of foods from the basic food groups: grains, vegetables, fruits, milk and milk products, and meat and beans. Some people may eat more of their favorite foods from only one food group and, as a result, miss getting the nutrients they need. So, it is important to pay attention not only to what you eat but also to what you are missing from your diet. You can eat a healthy, balanced diet by making a few small changes. Follow-up care is a key part of your treatment and safety. Be sure to make and go to all appointments, and call your doctor if you are having problems. It's also a good idea to know your test results and keep a list of the medicines you take. How can you care for yourself at home? Look at what you eat · Keep a food diary for a week or two and record everything you eat or drink. Track the number of servings you eat from each food group. · For a balanced diet every day, eat a variety of: 
? 6 or more ounce-equivalents of grains, such as cereals, breads, crackers, rice, or pasta, every day. An ounce-equivalent is 1 slice of bread, 1 cup of ready-to-eat cereal, or ½ cup of cooked rice, cooked pasta, or cooked cereal. 
? 2½ cups of vegetables, especially: § Dark-green vegetables such as broccoli and spinach. § Orange vegetables such as carrots and sweet potatoes. § Dry beans (such as valdes and kidney beans) and peas (such as lentils). ? 2 cups of fresh, frozen, or canned fruit. A small apple or 1 banana or orange equals 1 cup. ? 3 cups of nonfat or low-fat milk, yogurt, or other milk products. ? 5½ ounces of meat and beans, such as chicken, fish, lean meat, beans, nuts, and seeds. One egg, 1 tablespoon of peanut butter, ½ ounce nuts or seeds, or ¼ cup of cooked beans equals 1 ounce of meat. · Learn how to read food labels for serving sizes and ingredients. Fast-food and convenience-food meals often contain few or no fruits or vegetables. Make sure you eat some fruits and vegetables to make the meal more nutritious. · Look at your food diary. For each food group, add up what you have eaten and then divide the total by the number of days. This will give you an idea of how much you are eating from each food group. See if you can find some ways to change your diet to make it more healthy. Start small · Do not try to make dramatic changes to your diet all at once. You might feel that you are missing out on your favorite foods and then be more likely to fail. · Start slowly, and gradually change your habits. Try some of the following: ? Use whole wheat bread instead of white bread. ? Use nonfat or low-fat milk instead of whole milk. ? Eat brown rice instead of white rice, and eat whole wheat pasta instead of white-flour pasta. ? Try low-fat cheeses and low-fat yogurt. ? Add more fruits and vegetables to meals and have them for snacks. ? Add lettuce, tomato, cucumber, and onion to sandwiches. ? Add fruit to yogurt and cereal. 
Enjoy food · You can still eat your favorite foods. You just may need to eat less of them. If your favorite foods are high in fat, salt, and sugar, limit how often you eat them, but do not cut them out entirely. · Eat a wide variety of foods. Make healthy choices when eating out · The type of restaurant you choose can help you make healthy choices. Even fast-food chains are now offering more low-fat or healthier choices on the menu. · Choose smaller portions, or take half of your meal home. · When eating out, try: ? A veggie pizza with a whole wheat crust or grilled chicken (instead of sausage or pepperoni). ? Pasta with roasted vegetables, grilled chicken, or marinara sauce instead of cream sauce. ? A vegetable wrap or grilled chicken wrap. ? Broiled or poached food instead of fried or breaded items. Make healthy choices easy · Buy packaged, prewashed, ready-to-eat fresh vegetables and fruits, such as baby carrots, salad mixes, and chopped or shredded broccoli and cauliflower. · Buy packaged, presliced fruits, such as melon or pineapple. · Choose 100% fruit or vegetable juice instead of soda. Limit juice intake to 4 to 6 oz (½ to ¾ cup) a day. · Blend low-fat yogurt, fruit juice, and canned or frozen fruit to make a smoothie for breakfast or a snack. Where can you learn more? Go to http://www.whyte.com/ Enter T756 in the search box to learn more about \"Eating Healthy Foods: Care Instructions. \" Current as of: August 22, 2019               Content Version: 12.6 © 0806-1522 [employer]. Care instructions adapted under license by [employer] (which disclaims liability or warranty for this information). If you have questions about a medical condition or this instruction, always ask your healthcare professional. [facility] [address] any warranty or liability for your use of this information.

## 2021-01-05 NOTE — PROGRESS NOTES
HISTORY OF PRESENT ILLNESS  Sandra Guy is a 38 y.o. female. HPI ESTABLISHED patient here for a new lump in her LEFT breast.   She noticed it last week after an intense work out. She can feel it when she is sitting up. It is hard to feel when she lays down. Her plastic surgeon wanted it checked to rule out rupture. 5/2017 BRCA 2 positive  9/2017 Bilateral skin and nipple sparing mastectomy with prepectoral implant reconstruction    ROS    Physical Exam  Chest:      Breasts: Breasts are symmetrical.         Right: No inverted nipple, mass, nipple discharge, skin change or tenderness. Left: Swelling (LIQ focal swelling) present. No inverted nipple, mass, nipple discharge, skin change or tenderness. Lymphadenopathy:      Upper Body:      Right upper body: No supraclavicular or axillary adenopathy. Left upper body: No supraclavicular or axillary adenopathy. BREAST ULTRASOUND  Indication: Left  Breast focal swelling. Bilateral implants  Technique: The area was scanned using a high-frequency linear-array near-field transducer  Findings: the implant is within 1cm of the skin where she feels the mass. The implant and capsule are intact. Impression:  No evidence of rupture. Disposition: No worrisome finding on ultrasound  ASSESSMENT and PLAN    ICD-10-CM ICD-9-CM    1. Breast mass, left  N63.20 611.72      Total time spent with patient: 20 minutes   1. The LEFT implant is close to the skin at the lower inner quadrant where she feels a mass. 2. No evidence of implant rupture. The skin is intact and not compromised.

## 2021-01-05 NOTE — LETTER
1/5/2021 Patient: Flako Ly YOB: 1985 Date of Visit: 1/5/2021 Lu Overton MD 
[address] [facility] [address] [address] [address] Via Fax: [phone] Dear Lu Overton MD, Thank you for referring Ms. Flako Ly to [address] [place] [address] for evaluation. My notes for this consultation are attached. If you have questions, please do not hesitate to call me. I look forward to following your patient along with you.  
 
 
Sincerely, 
 
Roula Lloyd MD

## 2022-03-18 PROBLEM — Z80.3 FAMILY HX-BREAST MALIGNANCY: Status: ACTIVE | Noted: 2017-05-02

## 2022-04-12 ENCOUNTER — OFFICE VISIT (OUTPATIENT)
Dept: SURGERY | Age: 37
End: 2022-04-12
Payer: COMMERCIAL

## 2022-04-12 VITALS
HEIGHT: 64 IN | SYSTOLIC BLOOD PRESSURE: 115 MMHG | HEART RATE: 89 BPM | WEIGHT: 200 LBS | BODY MASS INDEX: 34.15 KG/M2 | DIASTOLIC BLOOD PRESSURE: 66 MMHG

## 2022-04-12 DIAGNOSIS — M79.622 AXILLARY PAIN, LEFT: ICD-10-CM

## 2022-04-12 DIAGNOSIS — Z80.3 FAMILY HX-BREAST MALIGNANCY: Primary | ICD-10-CM

## 2022-04-12 PROCEDURE — 76882 US LMTD JT/FCL EVL NVASC XTR: CPT | Performed by: SURGERY

## 2022-04-12 PROCEDURE — 99213 OFFICE O/P EST LOW 20 MIN: CPT | Performed by: SURGERY

## 2022-04-12 RX ORDER — TRAZODONE HYDROCHLORIDE 50 MG/1
50 TABLET ORAL
COMMUNITY

## 2022-04-12 NOTE — PROGRESS NOTES
HISTORY OF PRESENT ILLNESS  Marysol Barrett is a 39 y.o. female. HPI ESTABLISHED patient here today for complaints of LEFT axillary pain x 1 week. Stopped shaving. Axillary pain is getting progressively worse. No revisions after prophylactic mastectomies. 5/2017 BRCA 2 positive  9/2017 Bilateral skin and nipple sparing mastectomy with prepectoral implant reconstruction    ROS    Physical Exam  Chest:   Breasts:      Right: No inverted nipple, mass, nipple discharge, skin change, tenderness, axillary adenopathy or supraclavicular adenopathy. Left: No inverted nipple, mass, nipple discharge, skin change, tenderness, axillary adenopathy or supraclavicular adenopathy. Comments: Bilateral mastectomy with supramuscular implants  LEFT breast is slightly harder  Lymphadenopathy:      Upper Body:      Right upper body: No supraclavicular or axillary adenopathy. Left upper body: No supraclavicular or axillary adenopathy. LEFT AXILLARY ULTRASOUND  Indication: LEFT axillary pain  Technique:  LEFT axillary ultrasound was performed using a high-frequency linear-array near-field transducer  Findings: Normal 1cm axillary lymph node. No axillary mass  Impression: Normal axillary tissue   Disposition: pain is likely musculoskeletal  ASSESSMENT and PLAN    ICD-10-CM ICD-9-CM    1. Family hx-breast malignancy  Z80.3 V16.3    2. Axillary pain, left  M79.622 729.5      Total time spent with patient: 20 minutes   1. LEFT axillary pain  No abnormal finding on physical exam or ultrasound.   2. Pain is likely musculoskeletal.  Treat with heat and ibuprofen    PRN follow up

## 2022-04-12 NOTE — LETTER
4/12/2022    Patient: Patricia Childress   YOB: 1985   Date of Visit: 4/12/2022     Mikhail Wood MD  [facility] Noah Manzano MD    [facility]  [address] [place] [address]  Via Fax: [phone]    Dear Tomas Clines, MD Victorio Sever, MD,      Thank you for referring Ms. Patricia Childress to [address] [facility] for evaluation. My notes for this consultation are attached. If you have questions, please do not hesitate to call me. I look forward to following your patient along with you.       Sincerely,    Shara Baer MD

## 2022-04-12 NOTE — PATIENT INSTRUCTIONS

## 2022-04-30 ENCOUNTER — HOSPITAL ENCOUNTER (EMERGENCY)
Age: 37
Discharge: HOME | End: 2022-04-30
Attending: STUDENT IN AN ORGANIZED HEALTH CARE EDUCATION/TRAINING PROGRAM
Payer: COMMERCIAL

## 2022-04-30 ENCOUNTER — APPOINTMENT (OUTPATIENT)
Dept: CT IMAGING | Age: 37
End: 2022-04-30
Payer: COMMERCIAL

## 2022-04-30 VITALS
HEART RATE: 96 BPM | OXYGEN SATURATION: 99 % | WEIGHT: 199.96 LBS | SYSTOLIC BLOOD PRESSURE: 113 MMHG | TEMPERATURE: 99.6 F | HEIGHT: 65 IN | RESPIRATION RATE: 18 BRPM | DIASTOLIC BLOOD PRESSURE: 75 MMHG | BODY MASS INDEX: 33.31 KG/M2

## 2022-04-30 DIAGNOSIS — R31.9 URINARY TRACT INFECTION WITH HEMATURIA, SITE UNSPECIFIED: Primary | ICD-10-CM

## 2022-04-30 DIAGNOSIS — R10.9 BILATERAL FLANK PAIN: ICD-10-CM

## 2022-04-30 DIAGNOSIS — N39.0 URINARY TRACT INFECTION WITH HEMATURIA, SITE UNSPECIFIED: Primary | ICD-10-CM

## 2022-04-30 LAB
ALBUMIN SERPL-MCNC: 3.7 G/DL (ref 3.5–5)
ALBUMIN/GLOB SERPL: 0.9 {RATIO} (ref 1.1–2.2)
ALP SERPL-CCNC: 82 U/L (ref 45–117)
ALT SERPL-CCNC: 35 U/L (ref 12–78)
ANION GAP SERPL CALC-SCNC: 6 MMOL/L (ref 5–15)
APPEARANCE UR: CLEAR
AST SERPL-CCNC: 22 U/L (ref 15–37)
BACTERIA URNS QL MICRO: ABNORMAL /HPF
BASOPHILS # BLD: 0 K/UL (ref 0–0.1)
BASOPHILS NFR BLD: 0 % (ref 0–1)
BILIRUB SERPL-MCNC: 0.7 MG/DL (ref 0.2–1)
BILIRUB UR QL: NEGATIVE
BUN SERPL-MCNC: 10 MG/DL (ref 6–20)
BUN/CREAT SERPL: 13 (ref 12–20)
CALCIUM SERPL-MCNC: 8.9 MG/DL (ref 8.5–10.1)
CHLORIDE SERPL-SCNC: 101 MMOL/L (ref 97–108)
CO2 SERPL-SCNC: 27 MMOL/L (ref 21–32)
COLOR UR: YELLOW
CREAT SERPL-MCNC: 0.78 MG/DL (ref 0.55–1.02)
DIFFERENTIAL METHOD BLD: ABNORMAL
EOSINOPHIL # BLD: 0 K/UL (ref 0–0.4)
EOSINOPHIL NFR BLD: 0 % (ref 0–7)
EPITH CASTS URNS QL MICRO: ABNORMAL /LPF
ERYTHROCYTE [DISTWIDTH] IN BLOOD BY AUTOMATED COUNT: 12.1 % (ref 11.5–14.5)
GLOBULIN SER CALC-MCNC: 4 G/DL (ref 2–4)
GLUCOSE SERPL-MCNC: 120 MG/DL (ref 65–100)
GLUCOSE UR STRIP.AUTO-MCNC: NEGATIVE MG/DL
HCG UR QL: NEGATIVE
HCT VFR BLD AUTO: 35.7 % (ref 35–47)
HGB BLD-MCNC: 12 G/DL (ref 11.5–16)
HGB UR QL STRIP: ABNORMAL
HYALINE CASTS URNS QL MICRO: ABNORMAL /LPF (ref 0–2)
IMM GRANULOCYTES # BLD AUTO: 0 K/UL (ref 0–0.04)
IMM GRANULOCYTES NFR BLD AUTO: 0 % (ref 0–0.5)
KETONES UR QL STRIP.AUTO: NEGATIVE MG/DL
LEUKOCYTE ESTERASE UR QL STRIP.AUTO: ABNORMAL
LYMPHOCYTES # BLD: 1 K/UL (ref 0.8–3.5)
LYMPHOCYTES NFR BLD: 8 % (ref 12–49)
MCH RBC QN AUTO: 30.4 PG (ref 26–34)
MCHC RBC AUTO-ENTMCNC: 33.6 G/DL (ref 30–36.5)
MCV RBC AUTO: 90.4 FL (ref 80–99)
MONOCYTES # BLD: 1 K/UL (ref 0–1)
MONOCYTES NFR BLD: 8 % (ref 5–13)
NEUTS SEG # BLD: 10.2 K/UL (ref 1.8–8)
NEUTS SEG NFR BLD: 84 % (ref 32–75)
NITRITE UR QL STRIP.AUTO: NEGATIVE
NRBC # BLD: 0 K/UL (ref 0–0.01)
NRBC BLD-RTO: 0 PER 100 WBC
PH UR STRIP: 7 [PH] (ref 5–8)
PLATELET # BLD AUTO: 241 K/UL (ref 150–400)
PMV BLD AUTO: 11.1 FL (ref 8.9–12.9)
POTASSIUM SERPL-SCNC: 4.2 MMOL/L (ref 3.5–5.1)
PROT SERPL-MCNC: 7.7 G/DL (ref 6.4–8.2)
PROT UR STRIP-MCNC: NEGATIVE MG/DL
RBC # BLD AUTO: 3.95 M/UL (ref 3.8–5.2)
RBC #/AREA URNS HPF: ABNORMAL /HPF (ref 0–5)
SODIUM SERPL-SCNC: 134 MMOL/L (ref 136–145)
SP GR UR REFRACTOMETRY: 1.01 (ref 1–1.03)
UR CULT HOLD, URHOLD: NORMAL
UROBILINOGEN UR QL STRIP.AUTO: 0.2 EU/DL (ref 0.2–1)
WBC # BLD AUTO: 12.3 K/UL (ref 3.6–11)
WBC URNS QL MICRO: ABNORMAL /HPF (ref 0–4)

## 2022-04-30 PROCEDURE — 81025 URINE PREGNANCY TEST: CPT

## 2022-04-30 PROCEDURE — 81001 URINALYSIS AUTO W/SCOPE: CPT

## 2022-04-30 PROCEDURE — 85025 COMPLETE CBC W/AUTO DIFF WBC: CPT

## 2022-04-30 PROCEDURE — 74176 CT ABD & PELVIS W/O CONTRAST: CPT

## 2022-04-30 PROCEDURE — 99284 EMERGENCY DEPT VISIT MOD MDM: CPT

## 2022-04-30 PROCEDURE — 74011250637 HC RX REV CODE- 250/637: Performed by: PHYSICIAN ASSISTANT

## 2022-04-30 PROCEDURE — 96374 THER/PROPH/DIAG INJ IV PUSH: CPT

## 2022-04-30 PROCEDURE — 36415 COLL VENOUS BLD VENIPUNCTURE: CPT

## 2022-04-30 PROCEDURE — 74011250636 HC RX REV CODE- 250/636: Performed by: PHYSICIAN ASSISTANT

## 2022-04-30 PROCEDURE — 80053 COMPREHEN METABOLIC PANEL: CPT

## 2022-04-30 RX ORDER — ACETAMINOPHEN 325 MG/1
650 TABLET ORAL
Status: COMPLETED | OUTPATIENT
Start: 2022-04-30 | End: 2022-04-30

## 2022-04-30 RX ORDER — KETOROLAC TROMETHAMINE 30 MG/ML
30 INJECTION, SOLUTION INTRAMUSCULAR; INTRAVENOUS
Status: COMPLETED | OUTPATIENT
Start: 2022-04-30 | End: 2022-04-30

## 2022-04-30 RX ORDER — KETOROLAC TROMETHAMINE 10 MG/1
10 TABLET, FILM COATED ORAL
Qty: 20 TABLET | Refills: 0 | Status: SHIPPED | OUTPATIENT
Start: 2022-04-30 | End: 2022-05-05

## 2022-04-30 RX ADMIN — KETOROLAC TROMETHAMINE 30 MG: 30 INJECTION, SOLUTION INTRAMUSCULAR; INTRAVENOUS at 10:52

## 2022-04-30 RX ADMIN — ACETAMINOPHEN 650 MG: 325 TABLET ORAL at 12:48

## 2022-04-30 RX ADMIN — SODIUM CHLORIDE 1000 ML: 9 INJECTION, SOLUTION INTRAVENOUS at 10:30

## 2022-04-30 NOTE — ED TRIAGE NOTES
Pt arrives in triage with c/o bilateral lower back pain radiating down to legs w/ constipation. C/o pain with urination last week that has resolved. Further reports intermittent fever, N/V. Pt states they started nitrofurantoin yesterday for possible bladder infection. Pt denies CP.

## 2022-04-30 NOTE — PROGRESS NOTES
Patient's  called emergency department stating his wife is complaining of worsening headache and fever. Talked to patient and patient's  via telephone. Patient stated that the Toradol is making her headache worse. Patient states that she has a fever. now. Instructed patient to return to the emergency department immediately for re-evaluation. BLACK Caceres    I was personally available for consultation in the emergency department. I have reviewed the chart and agree with the documentation recorded by the [facility], including the assessment, treatment plan, and disposition.   Layne Overton MD

## 2022-04-30 NOTE — ED PROVIDER NOTES
Date of Service:  4/30/2022    Patient:  Madi Tolliver    Chief Complaint:  Back Pain and Fever       HPI:  Madi Tolliver is a 39 y.o.  female who presents for evaluation of bilateral flank pain and lower abdominal pain. Patient states pain darted a few days ago. Patient states that she was recently seen by her PCP yesterday and was treated for a urinary tract infection. Patient states that she was started on Macrobid. Patient states history of kidney stones. Patient denies hematuria. Patient endorses some frequency and dysuria. Patient denies chest pain, shortness of breath, fever, or chills. Patient states some nausea but denies vomiting.                Past Medical History:   Diagnosis Date    PCOS (polycystic ovarian syndrome)     Psychiatric disorder     anxiety    Thyroid disease        Past Surgical History:   Procedure Laterality Date    HX HEENT  09/2014    Parthyroid tumor    HX HERNIA REPAIR  1986    X2 for diaphragmatic hernias    HX PARATHYROIDECTOMY      NH BREAST SURGERY PROCEDURE UNLISTED      bilat Mastectomy         Family History:   Problem Relation Age of Onset    Diabetes Mother     Heart Disease Father     Hypertension Father     No Known Problems Sister        Social History     Socioeconomic History    Marital status:      Spouse name: Not on file    Number of children: Not on file    Years of education: Not on file    Highest education level: Not on file   Occupational History    Not on file   Tobacco Use    Smoking status: Never Smoker    Smokeless tobacco: Never Used   Substance and Sexual Activity    Alcohol use: Yes     Comment: rarely    Drug use: No    Sexual activity: Not on file   Other Topics Concern     Service Not Asked    Blood Transfusions Not Asked    Caffeine Concern Not Asked    Occupational Exposure Not Asked    Hobby Hazards Not Asked    Sleep Concern Not Asked    Stress Concern Not Asked    Weight Concern Not Asked    Special Diet Not Asked    Back Care Not Asked    Exercise Not Asked    Bike Helmet Not Asked    Seat Belt Not Asked    Self-Exams Not Asked   Social History Narrative    ** Merged History Encounter **          Social Determinants of Health     Financial Resource Strain:     Difficulty of Paying Living Expenses: Not on file   Food Insecurity:     Worried About Running Out of Food in the Last Year: Not on file    Pauly of Food in the Last Year: Not on file   Transportation Needs:     Lack of Transportation (Medical): Not on file    Lack of Transportation (Non-Medical): Not on file   Physical Activity:     Days of Exercise per Week: Not on file    Minutes of Exercise per Session: Not on file   Stress:     Feeling of Stress : Not on file   Social Connections:     Frequency of Communication with Friends and Family: Not on file    Frequency of Social Gatherings with Friends and Family: Not on file    Attends [employer] Services: Not on file    Active Member of [address] [place] [address] or Organizations: Not on file    Attends Club or Organization Meetings: Not on file    Marital Status: Not on file   Intimate Partner Violence:     Fear of Current or Ex-Partner: Not on file    Emotionally Abused: Not on file    Physically Abused: Not on file    Sexually Abused: Not on file   Housing Stability:     Unable to Pay for Housing in the Last Year: Not on file    Number of [place] in the Last Year: Not on file    Unstable Housing in the Last Year: Not on file         ALLERGIES: Sulfa (sulfonamide antibiotics), Morphine, Morphine, and Sulfa (sulfonamide antibiotics)    Review of Systems   Constitutional: Negative for chills and fever. Respiratory: Negative for shortness of breath. Cardiovascular: Negative for chest pain. Gastrointestinal: Positive for abdominal pain and nausea. Negative for vomiting. Genitourinary: Positive for dysuria, flank pain and frequency. Negative for difficulty urinating and urgency.    Skin: Negative for rash. Allergic/Immunologic: Negative for immunocompromised state. Neurological: Positive for headaches. Psychiatric/Behavioral: Negative for agitation. All other systems reviewed and are negative. Vitals:    04/30/22 0957   BP: 113/75   Pulse: 96   Resp: 18   Temp: 99.6 °F (37.6 °C)   SpO2: 99%   Weight: 90.7 kg (199 lb 15.3 oz)   Height: 5' 5\" (1.651 m)            Physical Exam  Vitals and nursing note reviewed. Constitutional:       General: She is not in acute distress. HENT:      Head: Atraumatic. Cardiovascular:      Rate and Rhythm: Normal rate and regular rhythm. Heart sounds: No murmur heard. Pulmonary:      Effort: Pulmonary effort is normal.      Breath sounds: Normal breath sounds. Abdominal:      Palpations: Abdomen is soft. Tenderness: There is abdominal tenderness. There is right CVA tenderness and left CVA tenderness. Musculoskeletal:         General: Normal range of motion. Skin:     General: Skin is warm. Neurological:      Mental Status: She is alert and oriented to person, place, and time. Mental status is at baseline. MDM       Procedures      VITAL SIGNS:  No data found. LABS:  No results found for this or any previous visit (from the past 6 hour(s)). IMAGING:  CT ABD PELV WO CONT   Final Result      1. Nonobstructing bilateral renal stones   2. Enlarged fatty liver            Medications During Visit:  Medications   sodium chloride 0.9 % bolus infusion 1,000 mL (0 mL IntraVENous IV Completed 4/30/22 1130)   ketorolac (TORADOL) injection 30 mg (30 mg IntraVENous Given 4/30/22 1052)   acetaminophen (TYLENOL) tablet 650 mg (650 mg Oral Given 4/30/22 1248)         DECISION MAKING:  Fadumo Patel is a 39 y.o. female who comes in as above. Uncooperative patient during ER encounter. Lab work shows slightly elevated WBC. UA showed evidence of a urinary tract infection. Patient is currently on treatment for a UTI by her PCP. Patient states that she is currently taking Macrobid for 7 days that was prescribed yesterday. Patient instructed to continue current antibiotics. Patient was prescribed Toradol as needed for pain. Patient agrees with plan of care. Patient stable upon discharge. Return precautions given to patient. IMPRESSION:  1. Urinary tract infection with hematuria, site unspecified    2. Bilateral flank pain        DISPOSITION:  Discharged      Discharge Medication List as of 4/30/2022  1:03 PM      START taking these medications    Details   ketorolac (TORADOL) 10 mg tablet Take 1 Tablet by mouth every six (6) hours as needed for Pain for up to 5 days. , Print, Disp-20 Tablet, R-0         CONTINUE these medications which have NOT CHANGED    Details   traZODone (DESYREL) 50 mg tablet Take 50 mg by mouth nightly., Historical Med      OTHER Historical Med      PHOSPHATIDYL SERINE, BULK, phosphatidyl serine (bulk), Historical Med      prenatal vit-iron fumarate-fa (PRENATAL VITAMIN) 27 mg iron- 0.8 mg tab tablet Prenatal Vitamin 27 mg iron-0.8 mg tablet, Historical Med      PNV No12-Iron-FA-DSS-OM-3 29 mg iron-1 mg -50 mg CPKD Take  by mouth., Historical Med      ibuprofen (MOTRIN) 200 mg tablet Take  by mouth every six (6) hours as needed for Pain., Historical Med      cholecalciferol (VITAMIN D3) 1,000 unit cap Take 5,000 Units by mouth daily. , Historical Med      pyridoxine, vitamin B6, (VITAMIN B-6) 50 mg tablet Take  by mouth daily. , Historical Med      DOCOSAHEXANOIC ACID/EPA (FISH OIL PO) Take  by mouth., Historical Med      !! ASCORBATE CALCIUM (VITAMIN C PO) Take 4,000 mg by mouth., Historical Med      B.ANI/L. ACI/L.SAL/L. PLAN/L.JESSICA (PROBIOTIC FORMULA PO) Take  by mouth., Historical Med      MAGNESIUM CARBONATE PO Take 300 mg by mouth., Historical Med      cetirizine (ZYRTEC) 10 mg tablet TAKE 1 TABLET BY MOUTH AT BEDTIME, Historical Med, R-1      !! ascorbic acid (VITAMIN C) 250 mg tablet Take  by mouth., Historical Med      b complex vitamins tablet Take 1 tablet by mouth daily. , Historical Med      Biotin 2,500 mcg cap Take  by mouth., Historical Med      magnesium 250 mg tab Take  by mouth., Historical Med      calcium 500 mg tab Take  by mouth., Historical Med      metFORMIN (GLUMETZA) 1,000 mg TG24 24 hour tablet Take  by mouth., Historical Med      B.infantis-B.ani-B.long-B.bifi (PROBIOTIC 4X) 10-15 mg TbEC Take  by mouth., Historical Med      melatonin 1 mg tablet Take  by mouth., Historical Med      LEVONORGESTREL (MIRENA IU) by IntraUTERine route., Historical Med       !! - Potential duplicate medications found. Please discuss with provider.            Follow-up Information     Follow up With Specialties Details Why Contact Info    Radha Varela MD Family Medicine Schedule an appointment as soon as possible for a visit   [address] [place] [address]  [phone]      [facility]  Schedule an appointment as soon as possible for a visit   [address] [phone] [facility] DEPT Emergency Medicine  If symptoms worsen [address] [facility]  [phone]

## 2022-10-20 ENCOUNTER — HOSPITAL ENCOUNTER (EMERGENCY)
Age: 37
Discharge: HOME | End: 2022-10-20
Attending: EMERGENCY MEDICINE
Payer: COMMERCIAL

## 2022-10-20 VITALS
OXYGEN SATURATION: 100 % | BODY MASS INDEX: 33.32 KG/M2 | HEIGHT: 65 IN | WEIGHT: 200 LBS | HEART RATE: 78 BPM | TEMPERATURE: 98.2 F | RESPIRATION RATE: 18 BRPM | DIASTOLIC BLOOD PRESSURE: 86 MMHG | SYSTOLIC BLOOD PRESSURE: 137 MMHG

## 2022-10-20 DIAGNOSIS — R07.9 CHEST PAIN, UNSPECIFIED TYPE: Primary | ICD-10-CM

## 2022-10-20 LAB
ALBUMIN SERPL-MCNC: 4.1 G/DL (ref 3.5–5)
ALBUMIN/GLOB SERPL: 1.1 {RATIO} (ref 1.1–2.2)
ALP SERPL-CCNC: 85 U/L (ref 45–117)
ALT SERPL-CCNC: 58 U/L (ref 12–78)
ANION GAP SERPL CALC-SCNC: 5 MMOL/L (ref 5–15)
AST SERPL-CCNC: 24 U/L (ref 15–37)
ATRIAL RATE: 71 BPM
BASOPHILS # BLD: 0 K/UL (ref 0–0.1)
BASOPHILS NFR BLD: 0 % (ref 0–1)
BILIRUB SERPL-MCNC: 0.3 MG/DL (ref 0.2–1)
BUN SERPL-MCNC: 15 MG/DL (ref 6–20)
BUN/CREAT SERPL: 20 (ref 12–20)
CALCIUM SERPL-MCNC: 9.5 MG/DL (ref 8.5–10.1)
CALCULATED P AXIS, ECG09: 98 DEGREES
CALCULATED R AXIS, ECG10: 76 DEGREES
CALCULATED T AXIS, ECG11: 97 DEGREES
CHLORIDE SERPL-SCNC: 102 MMOL/L (ref 97–108)
CO2 SERPL-SCNC: 29 MMOL/L (ref 21–32)
COMMENT, HOLDF: NORMAL
CREAT SERPL-MCNC: 0.74 MG/DL (ref 0.55–1.02)
DIAGNOSIS, 93000: NORMAL
DIFFERENTIAL METHOD BLD: ABNORMAL
EOSINOPHIL # BLD: 0.1 K/UL (ref 0–0.4)
EOSINOPHIL NFR BLD: 1 % (ref 0–7)
ERYTHROCYTE [DISTWIDTH] IN BLOOD BY AUTOMATED COUNT: 11.6 % (ref 11.5–14.5)
GLOBULIN SER CALC-MCNC: 3.9 G/DL (ref 2–4)
GLUCOSE SERPL-MCNC: 98 MG/DL (ref 65–100)
HCT VFR BLD AUTO: 38.9 % (ref 35–47)
HGB BLD-MCNC: 12.9 G/DL (ref 11.5–16)
IMM GRANULOCYTES # BLD AUTO: 0 K/UL (ref 0–0.04)
IMM GRANULOCYTES NFR BLD AUTO: 1 % (ref 0–0.5)
LIPASE SERPL-CCNC: 131 U/L (ref 73–393)
LYMPHOCYTES # BLD: 2.2 K/UL (ref 0.8–3.5)
LYMPHOCYTES NFR BLD: 28 % (ref 12–49)
MCH RBC QN AUTO: 30 PG (ref 26–34)
MCHC RBC AUTO-ENTMCNC: 33.2 G/DL (ref 30–36.5)
MCV RBC AUTO: 90.5 FL (ref 80–99)
MONOCYTES # BLD: 0.5 K/UL (ref 0–1)
MONOCYTES NFR BLD: 7 % (ref 5–13)
NEUTS SEG # BLD: 5.1 K/UL (ref 1.8–8)
NEUTS SEG NFR BLD: 63 % (ref 32–75)
NRBC # BLD: 0 K/UL (ref 0–0.01)
NRBC BLD-RTO: 0 PER 100 WBC
P-R INTERVAL, ECG05: 146 MS
PLATELET # BLD AUTO: 264 K/UL (ref 150–400)
PMV BLD AUTO: 10.1 FL (ref 8.9–12.9)
POTASSIUM SERPL-SCNC: 3.9 MMOL/L (ref 3.5–5.1)
PROT SERPL-MCNC: 8 G/DL (ref 6.4–8.2)
Q-T INTERVAL, ECG07: 396 MS
QRS DURATION, ECG06: 76 MS
QTC CALCULATION (BEZET), ECG08: 430 MS
RBC # BLD AUTO: 4.3 M/UL (ref 3.8–5.2)
SAMPLES BEING HELD,HOLD: NORMAL
SODIUM SERPL-SCNC: 136 MMOL/L (ref 136–145)
TROPONIN-HIGH SENSITIVITY: 8 NG/L (ref 0–51)
TROPONIN-HIGH SENSITIVITY: 8 NG/L (ref 0–51)
VENTRICULAR RATE, ECG03: 71 BPM
WBC # BLD AUTO: 8 K/UL (ref 3.6–11)

## 2022-10-20 PROCEDURE — 83690 ASSAY OF LIPASE: CPT

## 2022-10-20 PROCEDURE — 85025 COMPLETE CBC W/AUTO DIFF WBC: CPT

## 2022-10-20 PROCEDURE — 36415 COLL VENOUS BLD VENIPUNCTURE: CPT

## 2022-10-20 PROCEDURE — 99284 EMERGENCY DEPT VISIT MOD MDM: CPT

## 2022-10-20 PROCEDURE — 84484 ASSAY OF TROPONIN QUANT: CPT

## 2022-10-20 PROCEDURE — 80053 COMPREHEN METABOLIC PANEL: CPT

## 2022-10-20 PROCEDURE — 74011250636 HC RX REV CODE- 250/636: Performed by: STUDENT IN AN ORGANIZED HEALTH CARE EDUCATION/TRAINING PROGRAM

## 2022-10-20 PROCEDURE — 93005 ELECTROCARDIOGRAM TRACING: CPT

## 2022-10-20 RX ADMIN — SODIUM CHLORIDE 1000 ML: 9 INJECTION, SOLUTION INTRAVENOUS at 17:29

## 2022-10-20 NOTE — ED TRIAGE NOTES
Patient reports having episodes of left sided chest tightness. Has an episode Tuesday after eating and again today after eating lunch. Reports she also felt lightheaded with todays episode. Denies any accompanying SOB or CP at this time.

## 2022-10-20 NOTE — ED PROVIDER NOTES
Patient is a 39year old female who presents to ED c/o left sided chest tightness. Patient reports earlier today she developed an episode of left sided chest tightness that radiates down her left arm that lasted 10 minutes. States she felt light headed and near syncope when symptoms occurred. Patient reports she had a similar episode of left sided chest tightness radiating down left arm 2 days ago. States this occurred when she went to lie down to go to bed, about 2 hours after eating pizza. States episode lasted about 10 minutes and resolved spontaneously. States she is concerned because her father has CAD. She is currently asymptomatic. She denies any dyspepsia, abdominal pain, nausea, vomiting, shortness of breath, cough, difficulty breathing, fever, chills. States she has \"not been taking care of herself lately so sx may be related to anxiety. \"       Past Medical History:   Diagnosis Date    PCOS (polycystic ovarian syndrome)     Psychiatric disorder     anxiety    Thyroid disease        Past Surgical History:   Procedure Laterality Date    HX HEENT  09/2014    Parthyroid tumor    HX HERNIA REPAIR  1986    X2 for diaphragmatic hernias    HX PARATHYROIDECTOMY      IA BREAST SURGERY PROCEDURE UNLISTED      bilat Mastectomy         Family History:   Problem Relation Age of Onset    Diabetes Mother     Heart Disease Father     Hypertension Father     No Known Problems Sister        Social History     Socioeconomic History    Marital status:      Spouse name: Not on file    Number of children: Not on file    Years of education: Not on file    Highest education level: Not on file   Occupational History    Not on file   Tobacco Use    Smoking status: Never    Smokeless tobacco: Never   Substance and Sexual Activity    Alcohol use: Yes     Comment: rarely    Drug use: No    Sexual activity: Not on file   Other Topics Concern     Service Not Asked    Blood Transfusions Not Asked    Caffeine Concern Not Asked    Occupational Exposure Not Asked    Hobby Hazards Not Asked    Sleep Concern Not Asked    Stress Concern Not Asked    Weight Concern Not Asked    Special Diet Not Asked    Back Care Not Asked    Exercise Not Asked    Bike Helmet Not Asked    Seat Belt Not Asked    Self-Exams Not Asked   Social History Narrative    ** Merged History Encounter **          Social Determinants of Health     Financial Resource Strain: Not on file   Food Insecurity: Not on file   Transportation Needs: Not on file   Physical Activity: Not on file   Stress: Not on file   Social Connections: Not on file   Intimate Partner Violence: Not on file   Housing Stability: Not on file         ALLERGIES: Sulfa (sulfonamide antibiotics), Morphine, Morphine, and Sulfa (sulfonamide antibiotics)    Review of Systems   Constitutional:  Negative for activity change, appetite change, chills and fever. HENT:  Negative for congestion and sore throat. Eyes:  Negative for pain and visual disturbance. Respiratory:  Negative for cough and shortness of breath. Cardiovascular:  Positive for chest pain. Negative for palpitations and leg swelling. Gastrointestinal:  Negative for abdominal distention, abdominal pain, constipation, diarrhea, nausea and vomiting. Genitourinary:  Negative for decreased urine volume, dysuria, flank pain, frequency and urgency. Musculoskeletal:  Negative for back pain and neck pain. Skin:  Negative for rash and wound. Allergic/Immunologic: Negative for immunocompromised state. Neurological:  Positive for light-headedness. Negative for dizziness, syncope, weakness, numbness and headaches. Psychiatric/Behavioral:  Negative for confusion. All other systems reviewed and are negative. Vitals:    10/20/22 1616   BP: 137/86   Pulse: 78   Resp: 18   Temp: 98.2 °F (36.8 °C)   SpO2: 100%   Weight: 90.7 kg (200 lb)   Height: 5' 5\" (1.651 m)            Physical Exam  Vitals and nursing note reviewed. Constitutional:       General: She is not in acute distress. Appearance: Normal appearance. She is well-developed. She is not toxic-appearing. HENT:      Head: Normocephalic and atraumatic. Nose: Nose normal.      Mouth/Throat:      Mouth: Mucous membranes are moist.   Eyes:      General: Lids are normal.      Extraocular Movements: Extraocular movements intact. Conjunctiva/sclera: Conjunctivae normal.   Cardiovascular:      Rate and Rhythm: Normal rate and regular rhythm. Pulses: Normal pulses. Heart sounds: Normal heart sounds, S1 normal and S2 normal.   Pulmonary:      Effort: Pulmonary effort is normal. No accessory muscle usage. Breath sounds: Normal breath sounds. Abdominal:      Palpations: Abdomen is soft. Musculoskeletal:         General: Normal range of motion. Cervical back: Normal range of motion and neck supple. Skin:     General: Skin is warm and dry. Capillary Refill: Capillary refill takes less than 2 seconds. Neurological:      General: No focal deficit present. Mental Status: She is alert and oriented to person, place, and time. Mental status is at baseline. Psychiatric:         Attention and Perception: Attention normal.         Mood and Affect: Mood and affect normal.         Speech: Speech normal.         Behavior: Behavior is cooperative. Thought Content: Thought content normal.         Cognition and Memory: Cognition normal.         Judgment: Judgment normal.        MDM  Number of Diagnoses or Management Options  Chest pain, unspecified type  Diagnosis management comments: Patient presented due to chest pain that occurred earlier today. Another episode occurred 2 days ago. Vital signs stable. EKG normal sinus rhythm. Initial troponin 8, repeat troponin unchanged which is low risk to rule out ACS. Offered patient chest x-ray which she declined. Labs otherwise unremarkable.   Recommended patient follow-up with primary care physician as well as cardiologist whose information was provided. Strict return to ER precautions dm in detail with patient. All questions addressed and answered. Patient understands agrees plan.        Amount and/or Complexity of Data Reviewed  Clinical lab tests: reviewed  Tests in the radiology section of CPT®: reviewed  Discuss the patient with other providers: yes (Dr. Marcos Medley, ED attending)           Procedures

## 2022-10-20 NOTE — DISCHARGE INSTRUCTIONS
Please schedule an appointment to be seen by your primary care physician as well as cardiology. If you develop worsening chest pain or other concerning symptoms please return to the ER.

## 2023-02-02 ENCOUNTER — HOSPITAL ENCOUNTER (OUTPATIENT)
Dept: MRI IMAGING | Age: 38
Discharge: HOME | End: 2023-02-02
Attending: SURGERY
Payer: COMMERCIAL

## 2023-02-02 VITALS — WEIGHT: 200 LBS | BODY MASS INDEX: 33.28 KG/M2

## 2023-02-02 DIAGNOSIS — Z15.01 BRCA2 GENE MUTATION POSITIVE: ICD-10-CM

## 2023-02-02 DIAGNOSIS — Z15.09 BRCA2 GENE MUTATION POSITIVE: ICD-10-CM

## 2023-02-02 DIAGNOSIS — Z91.89 AT HIGH RISK FOR BREAST CANCER: ICD-10-CM

## 2023-02-02 PROCEDURE — 77030021566

## 2023-02-02 PROCEDURE — 74011250636 HC RX REV CODE- 250/636: Performed by: RADIOLOGY

## 2023-02-02 PROCEDURE — A9585 GADOBUTROL INJECTION: HCPCS | Performed by: RADIOLOGY

## 2023-02-02 PROCEDURE — 77049 MRI BREAST C-+ W/CAD BI: CPT

## 2023-02-02 RX ADMIN — GADOBUTROL 9 ML: 604.72 INJECTION INTRAVENOUS at 13:08

## 2023-02-06 NOTE — PROGRESS NOTES
Patient given results  No cancer  Issues with left implant  Patient already scheduled to have implant exchange. Will get info on where to fax report to plastics Dr. Rosalina Pham.

## 2025-04-08 ENCOUNTER — HOSPITAL ENCOUNTER (EMERGENCY)
Facility: HOSPITAL | Age: 40
Discharge: HOME | End: 2025-04-08
Attending: EMERGENCY MEDICINE
Payer: COMMERCIAL

## 2025-04-08 VITALS
BODY MASS INDEX: 33.32 KG/M2 | TEMPERATURE: 97.9 F | HEART RATE: 78 BPM | HEIGHT: 65 IN | OXYGEN SATURATION: 99 % | DIASTOLIC BLOOD PRESSURE: 69 MMHG | RESPIRATION RATE: 13 BRPM | WEIGHT: 200 LBS | SYSTOLIC BLOOD PRESSURE: 116 MMHG

## 2025-04-08 DIAGNOSIS — R19.7 NAUSEA VOMITING AND DIARRHEA: Primary | ICD-10-CM

## 2025-04-08 DIAGNOSIS — R11.2 NAUSEA VOMITING AND DIARRHEA: Primary | ICD-10-CM

## 2025-04-08 LAB
ALBUMIN SERPL-MCNC: 4.5 G/DL (ref 3.5–5)
ALBUMIN/GLOB SERPL: 1.1 (ref 1.1–2.2)
ALP SERPL-CCNC: 96 U/L (ref 45–117)
ALT SERPL-CCNC: 32 U/L (ref 12–78)
ANION GAP SERPL CALC-SCNC: 6 MMOL/L (ref 2–12)
APPEARANCE UR: CLEAR
AST SERPL-CCNC: 9 U/L (ref 15–37)
BACTERIA URNS QL MICRO: ABNORMAL /HPF
BASOPHILS # BLD: 0.05 K/UL (ref 0–0.1)
BASOPHILS NFR BLD: 0.5 % (ref 0–1)
BILIRUB SERPL-MCNC: 0.3 MG/DL (ref 0.2–1)
BILIRUB UR QL: NEGATIVE
BUN SERPL-MCNC: 15 MG/DL (ref 6–20)
BUN/CREAT SERPL: 18 (ref 12–20)
CALCIUM SERPL-MCNC: 9.7 MG/DL (ref 8.5–10.1)
CHLORIDE SERPL-SCNC: 105 MMOL/L (ref 97–108)
CO2 SERPL-SCNC: 25 MMOL/L (ref 21–32)
COLOR UR: ABNORMAL
CREAT SERPL-MCNC: 0.83 MG/DL (ref 0.55–1.02)
DIFFERENTIAL METHOD BLD: ABNORMAL
EKG ATRIAL RATE: 85 BPM
EKG DIAGNOSIS: NORMAL
EKG P AXIS: 39 DEGREES
EKG P-R INTERVAL: 152 MS
EKG Q-T INTERVAL: 362 MS
EKG QRS DURATION: 70 MS
EKG QTC CALCULATION (BAZETT): 430 MS
EKG R AXIS: 40 DEGREES
EKG T AXIS: 32 DEGREES
EKG VENTRICULAR RATE: 85 BPM
EOSINOPHIL # BLD: 0.4 K/UL (ref 0–0.4)
EOSINOPHIL NFR BLD: 3.8 % (ref 0–7)
EPITH CASTS URNS QL MICRO: ABNORMAL /LPF
ERYTHROCYTE [DISTWIDTH] IN BLOOD BY AUTOMATED COUNT: 11.9 % (ref 11.5–14.5)
GLOBULIN SER CALC-MCNC: 4.2 G/DL (ref 2–4)
GLUCOSE SERPL-MCNC: 109 MG/DL (ref 65–100)
GLUCOSE UR STRIP.AUTO-MCNC: NEGATIVE MG/DL
HCG, URINE, POC: NEGATIVE
HCT VFR BLD AUTO: 42.7 % (ref 35–47)
HGB BLD-MCNC: 14.6 G/DL (ref 11.5–16)
HGB UR QL STRIP: ABNORMAL
IMM GRANULOCYTES # BLD AUTO: 0.07 K/UL (ref 0–0.04)
IMM GRANULOCYTES NFR BLD AUTO: 0.7 % (ref 0–0.5)
KETONES UR QL STRIP.AUTO: NEGATIVE MG/DL
LEUKOCYTE ESTERASE UR QL STRIP.AUTO: ABNORMAL
LIPASE SERPL-CCNC: 27 U/L (ref 13–75)
LYMPHOCYTES # BLD: 1.8 K/UL (ref 0.8–3.5)
LYMPHOCYTES NFR BLD: 17.3 % (ref 12–49)
Lab: NORMAL
MAGNESIUM SERPL-MCNC: 2 MG/DL (ref 1.6–2.4)
MCH RBC QN AUTO: 29.6 PG (ref 26–34)
MCHC RBC AUTO-ENTMCNC: 34.2 G/DL (ref 30–36.5)
MCV RBC AUTO: 86.4 FL (ref 80–99)
MONOCYTES # BLD: 0.81 K/UL (ref 0–1)
MONOCYTES NFR BLD: 7.8 % (ref 5–13)
NEGATIVE QC PASS/FAIL: NORMAL
NEUTS SEG # BLD: 7.27 K/UL (ref 1.8–8)
NEUTS SEG NFR BLD: 69.9 % (ref 32–75)
NITRITE UR QL STRIP.AUTO: NEGATIVE
NRBC # BLD: 0 K/UL (ref 0–0.01)
NRBC BLD-RTO: 0 PER 100 WBC
PH UR STRIP: 6 (ref 5–8)
PLATELET # BLD AUTO: 321 K/UL (ref 150–400)
PMV BLD AUTO: 10.1 FL (ref 8.9–12.9)
POSITIVE QC PASS/FAIL: NORMAL
POTASSIUM SERPL-SCNC: 4.1 MMOL/L (ref 3.5–5.1)
PROT SERPL-MCNC: 8.7 G/DL (ref 6.4–8.2)
PROT UR STRIP-MCNC: NEGATIVE MG/DL
RBC # BLD AUTO: 4.94 M/UL (ref 3.8–5.2)
RBC #/AREA URNS HPF: ABNORMAL /HPF (ref 0–5)
SODIUM SERPL-SCNC: 136 MMOL/L (ref 136–145)
SP GR UR REFRACTOMETRY: <1.005 (ref 1–1.03)
SPECIMEN HOLD: NORMAL
UROBILINOGEN UR QL STRIP.AUTO: 0.2 EU/DL (ref 0.2–1)
WBC # BLD AUTO: 10.4 K/UL (ref 3.6–11)
WBC URNS QL MICRO: ABNORMAL /HPF (ref 0–4)

## 2025-04-08 PROCEDURE — 2580000003 HC RX 258

## 2025-04-08 PROCEDURE — 83690 ASSAY OF LIPASE: CPT

## 2025-04-08 PROCEDURE — 99284 EMERGENCY DEPT VISIT MOD MDM: CPT

## 2025-04-08 PROCEDURE — 36415 COLL VENOUS BLD VENIPUNCTURE: CPT

## 2025-04-08 PROCEDURE — 93005 ELECTROCARDIOGRAM TRACING: CPT

## 2025-04-08 PROCEDURE — 80053 COMPREHEN METABOLIC PANEL: CPT

## 2025-04-08 PROCEDURE — 6360000002 HC RX W HCPCS

## 2025-04-08 PROCEDURE — 81001 URINALYSIS AUTO W/SCOPE: CPT

## 2025-04-08 PROCEDURE — 96374 THER/PROPH/DIAG INJ IV PUSH: CPT

## 2025-04-08 PROCEDURE — 83735 ASSAY OF MAGNESIUM: CPT

## 2025-04-08 PROCEDURE — 85025 COMPLETE CBC W/AUTO DIFF WBC: CPT

## 2025-04-08 RX ORDER — ONDANSETRON 2 MG/ML
4 INJECTION INTRAMUSCULAR; INTRAVENOUS ONCE
Status: COMPLETED | OUTPATIENT
Start: 2025-04-08 | End: 2025-04-08

## 2025-04-08 RX ORDER — 0.9 % SODIUM CHLORIDE 0.9 %
1000 INTRAVENOUS SOLUTION INTRAVENOUS ONCE
Status: COMPLETED | OUTPATIENT
Start: 2025-04-08 | End: 2025-04-08

## 2025-04-08 RX ADMIN — SODIUM CHLORIDE 1000 ML: 0.9 INJECTION, SOLUTION INTRAVENOUS at 13:15

## 2025-04-08 RX ADMIN — SODIUM CHLORIDE 1000 ML: 0.9 INJECTION, SOLUTION INTRAVENOUS at 11:25

## 2025-04-08 RX ADMIN — ONDANSETRON 4 MG: 2 INJECTION, SOLUTION INTRAMUSCULAR; INTRAVENOUS at 13:45

## 2025-04-08 ASSESSMENT — PAIN SCALES - GENERAL: PAINLEVEL_OUTOF10: 3

## 2025-04-08 ASSESSMENT — ENCOUNTER SYMPTOMS
NAUSEA: 1
VOMITING: 1
DIARRHEA: 1

## 2025-04-08 ASSESSMENT — LIFESTYLE VARIABLES
HOW MANY STANDARD DRINKS CONTAINING ALCOHOL DO YOU HAVE ON A TYPICAL DAY: PATIENT DECLINED
HOW OFTEN DO YOU HAVE A DRINK CONTAINING ALCOHOL: PATIENT DECLINED

## 2025-04-08 ASSESSMENT — PAIN DESCRIPTION - LOCATION: LOCATION: ABDOMEN

## 2025-04-08 ASSESSMENT — PAIN DESCRIPTION - DESCRIPTORS: DESCRIPTORS: ACHING

## 2025-04-08 ASSESSMENT — PAIN - FUNCTIONAL ASSESSMENT: PAIN_FUNCTIONAL_ASSESSMENT: 0-10

## 2025-04-08 NOTE — ED PROVIDER NOTES
Efforts were made to edit the dictations but occasionally words are mis-transcribed.)    Arlene Hoskins PA-C (electronically signed)  Emergency Attending Physician / Physician Assistant / Nurse Practitioner             Arlene Hoskins PA-C  04/08/25 [id number]

## 2025-04-08 NOTE — ED TRIAGE NOTES
Pot states she was recently started on trizipeptide for weight loss. Says this was her first dose ever and several days after the shot she started with NVD    Pt states she had a vasovagal episode near syncope episode while having an episode of diarrhea

## 2025-04-08 NOTE — ED NOTES
Pt given dc paperwork and education. Iv taken out w/o difficulty. Pt ambulatory with steady gait out of ED.

## 2025-04-08 NOTE — DISCHARGE INSTRUCTIONS
You were seen in the emergency department for nausea, vomiting and diarrhea. Your symptoms appear to be due to a viral illness or medication side effect.  This is treated with supportive care, including your fluid intake, over the counter fever and pain reducers such as ibuprofen or tylenol, and rest. Take all other medications as prescribed and directed.     Your condition should improve over the next 5 days with the care discussed. You should return to the ER- if you experience increased fevers that do not improve with use of Tylenol and Motrin (as directed),  Inability to tolerate oral intake, increased abdominal pain, neck stiffness, confusion, or other worsening or worrisome concerns. You should follow up with your primary care provider this week for further evaluation.

## 2025-07-02 ENCOUNTER — HOSPITAL ENCOUNTER (OUTPATIENT)
Facility: HOSPITAL | Age: 40
Discharge: HOME | End: 2025-07-05
Payer: COMMERCIAL

## 2025-07-02 DIAGNOSIS — R10.11 ABDOMINAL PAIN, RIGHT UPPER QUADRANT: ICD-10-CM

## 2025-07-02 DIAGNOSIS — R09.A2 GLOBUS SENSATION: ICD-10-CM

## 2025-07-02 PROCEDURE — 76536 US EXAM OF HEAD AND NECK: CPT

## 2025-07-02 PROCEDURE — 76705 ECHO EXAM OF ABDOMEN: CPT
